# Patient Record
Sex: MALE | Race: WHITE
[De-identification: names, ages, dates, MRNs, and addresses within clinical notes are randomized per-mention and may not be internally consistent; named-entity substitution may affect disease eponyms.]

---

## 2020-08-14 ENCOUNTER — HOSPITAL ENCOUNTER (OUTPATIENT)
Dept: HOSPITAL 47 - RADPETMAIN | Age: 70
Discharge: HOME | End: 2020-08-14
Attending: RADIOLOGY
Payer: OTHER GOVERNMENT

## 2020-08-14 DIAGNOSIS — Z92.3: ICD-10-CM

## 2020-08-14 DIAGNOSIS — Z98.890: ICD-10-CM

## 2020-08-14 DIAGNOSIS — R59.0: ICD-10-CM

## 2020-08-14 DIAGNOSIS — C79.51: ICD-10-CM

## 2020-08-14 DIAGNOSIS — C32.0: ICD-10-CM

## 2020-08-14 DIAGNOSIS — C78.01: Primary | ICD-10-CM

## 2020-08-14 PROCEDURE — 78815 PET IMAGE W/CT SKULL-THIGH: CPT

## 2020-08-18 NOTE — PE
EXAMINATION TYPE: PET CT fusion skull to thigh

 

DATE OF EXAM: 8/14/2020

 

COMPARISON: Prior outside PET/CT January 27, 2020

 

HISTORY: Laryngeal cancer diagnosed January 9, 2020 had surgery February 25, 2020 and radiation treat
ment in May.

 

TECHNIQUE:  Following the intravenous administration of 11.63 mCi of F-18 FDG, whole body images are 
performed from the skull base to the midthigh.  Images are reviewed on the computer in the coronal, a
xial, and sagittal planes.  Reconstructed rotating images are created on independent workstation and 
reviewed on the computer.   A noncontrast CT is performed in conjunction with the PET scan. Dedicated
 PET/CT imaging of the neck is performed.

 

SCAN: Subsequent Scan

 

FINDINGS: 

 

SKULL BASE AND NECK:  Hypermetabolic uptake on prior study at level of the vocal cords with soft tiss
ue thickening is now not identified. There has been interval extensive neck surgery with resection of
 this area and numerous surgical clips submandibular region. Thyroid gland is now surgically absent. 
Tracheostomy tube now present below this level. No suspicious areas of new or residual abnormal hyper
metabolic uptake.

 

CHEST, MEDIASTINUM, AND HILAR REGION: There is however interval development of new large hypermetabol
ic right invasive hilar mass axial image 93 measuring roughly 6.1 x 4.2 cm, max SUV is 6.65. There is
 some postobstructive atelectasis along the periphery.

 

There is adjacent additional hypermetabolic 2.2 x 1.9 cm right paratracheal adenopathy.. There is add
itional hypermetabolic right hilar lymph node measuring 2.7 x 2.6 cm axial image 101, max SUV is 4.72
.

 

ABDOMEN AND PELVIS: Some faint multifocal subcentimeter areas of hypermetabolic uptake throughout the
 liver are suspicious for metastatic disease though CT correlates not clearly identified. For referen
ce lateral left hepatic lobe punctate focus max SUV 3.58 on axial image 149. Largest lesion with some
 central calcifications lateral segment left hepatic lobe axial image 137 is favored benign as is delaney
tabolic and was present on prior study. Similar subcentimeter benign low dense lesion anterior liver 
axial image 146 noted.

 

Normal excretion is seen. No additional areas of suspicious hypermetabolic uptake.

 

OSSEOUS STRUCTURES: New Osseous metastatic disease with several new hypermetabolic foci, for referenc
e right iliac lesion and left sacral lesion axial image 206 along with lytic focus L5 vertebra axial 
image 190. There is large hypermetabolic lesion L1 vertebra axial image 151, max SUV is 5.85. Few add
itional scattered right rib lesions are felt present.

 

OTHER CT: Coronary artery calcification and/or stents are identified. There is new tiny right pleural
 effusion.

 

There are several thin-walled cysts scattered throughout both kidneys. There is retroaortic left south
l vein which is normal variant. There is ectatic and mild/moderate calcification in the abdominal aor
ta. Sigmoid colonic diverticula. Scattered pelvic phleboliths.

 

IMPRESSION: Successful surgical and radiation treatment of primary laryngeal neoplasm but interval de
velopment of metastatic disease in the central right lung with thoracic adenopathy, new osseous metas
tatic disease, and suspected new subcentimeter hepatic metastatic disease.

## 2020-08-21 ENCOUNTER — HOSPITAL ENCOUNTER (OUTPATIENT)
Dept: HOSPITAL 47 - RADMRIMAIN | Age: 70
Discharge: HOME | End: 2020-08-21
Attending: RADIOLOGY
Payer: OTHER GOVERNMENT

## 2020-08-21 DIAGNOSIS — Z92.3: ICD-10-CM

## 2020-08-21 DIAGNOSIS — Z97.8: ICD-10-CM

## 2020-08-21 DIAGNOSIS — I67.82: ICD-10-CM

## 2020-08-21 DIAGNOSIS — G93.89: ICD-10-CM

## 2020-08-21 DIAGNOSIS — C32.0: ICD-10-CM

## 2020-08-21 DIAGNOSIS — G31.9: Primary | ICD-10-CM

## 2020-08-21 PROCEDURE — 70553 MRI BRAIN STEM W/O & W/DYE: CPT

## 2020-08-21 NOTE — MR
EXAMINATION TYPE: MR brain wo/w con

 

DATE OF EXAM: 8/21/2020

 

COMPARISON: None

 

HISTORY: Malignant neoplasm of glottis, head pain

 

TECHNIQUE: 

Multiplanar, multisequence images of the brain and brainstem is performed without and with IV contras
t, utilizing 7.5 mL intravenous Gadavist .

 

FINDINGS: Diffusion weighted images demonstrate no evidence of a recent infarct or other diffusion ab
normality. There is moderate generalized degenerative change. Areas of abnormal signal in the white m
atter are nonspecific but most typical remote microvascular ischemia.

 

There is a 5 mm nodular change involving the left frontal bone for which a CT scan is recommended. No
 intraparenchymal areas of enhancement suspicious for metastases.

 

Midline structures demonstrate normal morphology.  The craniocervical junction appears within normal 
limits.  Post contrast images demonstrate no abnormal enhancement. The dural venous sinuses appear pa
tent. Changes of chronic sinusitis noted.

 

 

IMPRESSION: 

1. No intracranial areas of metastases seen. Degenerative and nonspecific white matter changes most t
ypical remote microvascular ischemia.

2. There is a 1 cm area of nodular prominence involving the left frontal bone which be correlated wit
h CT of the brain with bone windows for further evaluation. Best noted on postcontrast T1 axial image
 49.

## 2020-12-12 ENCOUNTER — HOSPITAL ENCOUNTER (OUTPATIENT)
Dept: HOSPITAL 47 - RADPETMAIN | Age: 70
Discharge: HOME | End: 2020-12-12
Attending: INTERNAL MEDICINE
Payer: OTHER GOVERNMENT

## 2020-12-12 DIAGNOSIS — Z92.21: ICD-10-CM

## 2020-12-12 DIAGNOSIS — C79.51: Primary | ICD-10-CM

## 2020-12-12 DIAGNOSIS — C34.81: ICD-10-CM

## 2020-12-12 PROCEDURE — 78815 PET IMAGE W/CT SKULL-THIGH: CPT

## 2020-12-14 NOTE — PE
EXAMINATION TYPE: PET CT fusion skull to thigh

 

DATE OF EXAM: 12/12/2020

 

COMPARISON: Most recent PET CT August 14, 2020 and older studies.

 

HISTORY: Head and neck cancer diagnosed January 2020 and lung cancer diagnosed August 2020 completed 
radiation treatment in September and chemotherapy in November.   

 

TECHNIQUE:  Following the intravenous administration of 9.4 mCi of F-18 FDG, whole body images are pe
rformed from the skull base to the midthigh.  Images are reviewed on the computer in the coronal, axi
al, and sagittal planes.  Reconstructed rotating images are created on independent workstation and re
viewed on the computer.   A localization and attenuation correction CT is performed in conjunction wi
th the PET scan. PET/CT imaging of the neck is performed.

 

SCAN: Subsequent Scan

 

FINDINGS: 

 

SKULL BASE AND NECK:  No new areas of abnormal hypermetabolic uptake. Extensive surgical and post manish
atment change redemonstrated with tracheostomy tube inferiorly again seen.

 

CHEST, MEDIASTINUM, AND HILAR REGION: Marked interval improvement in the hypermetabolic large invasiv
e right hilar mass or neoplasm with some residual abnormal soft tissue surrounding right upper lobe b
ronchus axial image 85, this area is currently ametabolic.

 

Marked interval improvement in the right peritracheal/tracheobronchial lymph node measuring 1.0 x 0.8
 cm current study image 83 versus 2.2 x 1.9 cm prior study. Lymph node currently ametabolic. Hypermet
abolic right hilar lymph node on prior study less well seen, no residual hypermetabolic uptake.

 

No new areas of abnormal hypermetabolic uptake.

 

ABDOMEN AND PELVIS: No areas of abnormal hypermetabolic uptake.

 

OSSEOUS STRUCTURES: No new areas of abnormal hypermetabolic uptake. Scattered sclerotic osseous metas
tatic lesions redemonstrated greatest in the pelvis with interval progression in size and number of s
clerotic lesions noted.

 

OTHER CT: Coronary artery calcification and/or stents are redemonstrated. 

 

There are several thin-walled cysts scattered throughout both kidneys. There is retroaortic left south
l vein which is normal variant. There is ectatic and mild/moderate calcification in the abdominal aor
ta. Stable low dense lesion lateral aspect left hepatic lobe presumed benign with central thin septat
ion and calcification.

 

Sigmoid colonic diverticula. Scattered pelvic phleboliths.

 

 

IMPRESSION: No areas of abnormal hypermetabolic uptake on current study. Marked interval improvement 
in right hilar mass or neoplasm and thoracic adenopathy. Interval progression in osseous sclerotic me
tastatic disease without abnormal hypermetabolic uptake is noted.

## 2021-03-05 ENCOUNTER — HOSPITAL ENCOUNTER (EMERGENCY)
Dept: HOSPITAL 47 - EC | Age: 71
Discharge: HOME | End: 2021-03-05
Payer: OTHER GOVERNMENT

## 2021-03-05 VITALS
DIASTOLIC BLOOD PRESSURE: 83 MMHG | HEART RATE: 58 BPM | TEMPERATURE: 98 F | SYSTOLIC BLOOD PRESSURE: 124 MMHG | RESPIRATION RATE: 20 BRPM

## 2021-03-05 DIAGNOSIS — R55: Primary | ICD-10-CM

## 2021-03-05 DIAGNOSIS — Z87.891: ICD-10-CM

## 2021-03-05 DIAGNOSIS — I10: ICD-10-CM

## 2021-03-05 LAB
ALBUMIN SERPL-MCNC: 4.1 G/DL (ref 3.5–5)
ALP SERPL-CCNC: 76 U/L (ref 38–126)
ALT SERPL-CCNC: 20 U/L (ref 4–49)
ANION GAP SERPL CALC-SCNC: 13 MMOL/L
APTT BLD: 19.5 SEC (ref 22–30)
AST SERPL-CCNC: 26 U/L (ref 17–59)
BUN SERPL-SCNC: 22 MG/DL (ref 9–20)
CALCIUM SPEC-MCNC: 9.5 MG/DL (ref 8.4–10.2)
CELLS COUNTED: 100
CHLORIDE SERPL-SCNC: 104 MMOL/L (ref 98–107)
CO2 SERPL-SCNC: 20 MMOL/L (ref 22–30)
EOSINOPHIL # BLD MANUAL: 0.14 K/UL (ref 0–0.7)
ERYTHROCYTE [DISTWIDTH] IN BLOOD BY AUTOMATED COUNT: 4.73 M/UL (ref 4.3–5.9)
ERYTHROCYTE [DISTWIDTH] IN BLOOD: 14.1 % (ref 11.5–15.5)
GLUCOSE SERPL-MCNC: 121 MG/DL (ref 74–99)
HCT VFR BLD AUTO: 42.4 % (ref 39–53)
HGB BLD-MCNC: 14.1 GM/DL (ref 13–17.5)
INR PPP: 0.9 (ref ?–1.2)
LYMPHOCYTES # BLD MANUAL: 1.41 K/UL (ref 1–4.8)
MAGNESIUM SPEC-SCNC: 2 MG/DL (ref 1.6–2.3)
MCH RBC QN AUTO: 29.7 PG (ref 25–35)
MCHC RBC AUTO-ENTMCNC: 33.2 G/DL (ref 31–37)
MCV RBC AUTO: 89.6 FL (ref 80–100)
MONOCYTES # BLD MANUAL: 0.66 K/UL (ref 0–1)
NEUTROPHILS NFR BLD MANUAL: 53 %
NEUTS SEG # BLD MANUAL: 2.49 K/UL (ref 1.3–7.7)
PLATELET # BLD AUTO: 167 K/UL (ref 150–450)
POTASSIUM SERPL-SCNC: 3.8 MMOL/L (ref 3.5–5.1)
PROT SERPL-MCNC: 7.1 G/DL (ref 6.3–8.2)
PT BLD: 10 SEC (ref 9–12)
SODIUM SERPL-SCNC: 137 MMOL/L (ref 137–145)
WBC # BLD AUTO: 4.7 K/UL (ref 3.8–10.6)

## 2021-03-05 PROCEDURE — 83605 ASSAY OF LACTIC ACID: CPT

## 2021-03-05 PROCEDURE — 85730 THROMBOPLASTIN TIME PARTIAL: CPT

## 2021-03-05 PROCEDURE — 80320 DRUG SCREEN QUANTALCOHOLS: CPT

## 2021-03-05 PROCEDURE — 84484 ASSAY OF TROPONIN QUANT: CPT

## 2021-03-05 PROCEDURE — 93005 ELECTROCARDIOGRAM TRACING: CPT

## 2021-03-05 PROCEDURE — 36415 COLL VENOUS BLD VENIPUNCTURE: CPT

## 2021-03-05 PROCEDURE — 71046 X-RAY EXAM CHEST 2 VIEWS: CPT

## 2021-03-05 PROCEDURE — 80053 COMPREHEN METABOLIC PANEL: CPT

## 2021-03-05 PROCEDURE — 85610 PROTHROMBIN TIME: CPT

## 2021-03-05 PROCEDURE — 85025 COMPLETE CBC W/AUTO DIFF WBC: CPT

## 2021-03-05 PROCEDURE — 99285 EMERGENCY DEPT VISIT HI MDM: CPT

## 2021-03-05 PROCEDURE — 96360 HYDRATION IV INFUSION INIT: CPT

## 2021-03-05 PROCEDURE — 83735 ASSAY OF MAGNESIUM: CPT

## 2021-03-05 PROCEDURE — 83880 ASSAY OF NATRIURETIC PEPTIDE: CPT

## 2021-03-05 NOTE — ED
SOB HPI





- General


Chief Complaint: Shortness of Breath


Stated Complaint: altered mental status


Time Seen by Provider: 03/05/21 02:31


Source: EMS


Mode of arrival: EMS


Limitations: language barrier





- History of Present Illness


Initial Comments: 





This patient is a 70-year-old man brought by ambulance for evaluation after his 

wife had found him slumped over at home and had a difficult time arousing him.  

History from the patient is slightly limited as she is not speaking secondary to

tracheostomy.  He is able to communicate fairly well through gestures.  When I 

interview him he complains of some shortness of breath which he indicates is 

mild and also a frontal headache that he indicates is moderate, not worst 

headache of life.





Further history from the patient's wife does reveal that the patient had slumped

over and she was uncertain if he was breathing.  When EMS had arrived and they 

repositioned him he did have an episode of vomiting.  He had completed a round 

of immunotherapy for his cancer on Monday and he had covid vaccination on 

Tuesday.


MD Complaint: shortness of breath


-: minutes(s)


Consistency: constant


Improves With: nothing


Worsens With: nothing


Treatments Prior to Arrival: none





- Related Data


                                Home Medications











 Medication  Instructions  Recorded  Confirmed


 


Atenolol [Tenormin] 50 mg PO QAM 08/25/20 08/25/20


 


Levothyroxine Sodium 125 mcg PO QAM 08/25/20 08/25/20


 


Multivitamins, Thera [Multivitamin 1 tab PO DAILY 08/25/20 08/25/20





(formulary)]   


 


hydroCHLOROthiazide 25 mg PO QAM 08/25/20 08/25/20


 


diazePAM [Valium] 5 mg PO Q8HR PRN 08/27/20 08/27/20











                                    Allergies











Allergy/AdvReac Type Severity Reaction Status Date / Time


 


No Known Allergies Allergy   Verified 08/27/20 11:18














Review of Systems


ROS Statement: 


Those systems with pertinent positive or pertinent negative responses have been 

documented in the HPI.





ROS Other: All systems not noted in ROS Statement are negative.


Constitutional: Denies: fever, chills


Respiratory: Reports: dyspnea.  Denies: cough, wheezes, hemoptysis


Cardiovascular: Denies: chest pain, palpitations, orthopnea, edema, syncope


Gastrointestinal: Denies: abdominal pain, nausea, vomiting


Genitourinary: Denies: dysuria, hematuria


Musculoskeletal: Denies: back pain


Skin: Denies: rash


Neurological: Reports: headache.  Denies: weakness





Past Medical History


Past Medical History: Cancer, Hypertension, Thyroid Disorder


Additional Past Medical History / Comment(s): CURRENT: PET SCAN SHOWS POSSIBLE 

METASTATIC LESION IN RIGHT CENTRAL LUNG; PATIENT HAS HAD A COUGH FOR SEVERAL 

WEEKS.   2020, GLOTTIS CANCER (SQUAMOUS CELL).  MULTIPLE SKIN CANCERS RIGHT 

CHEST, LEFT ELBOW,   RIGHT ELBOW.


History of Any Multi-Drug Resistant Organisms: None Reported


Additional Past Surgical History / Comment(s): FEB 25, 2020 LARYNGECTOMY 

(TRACH), PARTIAL RESECTION OF NASAL AND THROAT ARE & THYROIDECTOMY @ [facility].  PROSTHETIC USED TO HELP HIM TO TALK.  ABLE TO EAT ORALLY.


Past Anesthesia/Blood Transfusion Reactions: No Reported Reaction


Past Psychological History: No Psychological Hx Reported


Smoking Status: Former smoker


Past Alcohol Use History: Daily


Past Drug Use History: None Reported





General Exam


Limitations: no limitations


General appearance: alert, in no apparent distress


Head exam: Present: atraumatic, normocephalic


Eye exam: Present: normal appearance, PERRL, EOMI.  Absent: scleral icterus, 

conjunctival injection, nystagmus


ENT exam: Present: mucous membranes dry


Neck exam: Present: full ROM, other (Tracheostomy).  Absent: meningismus


Respiratory exam: Present: normal lung sounds bilaterally.  Absent: respiratory 

distress, wheezes, rales, rhonchi, stridor


Cardiovascular Exam: Present: regular rate, normal rhythm, normal heart sounds. 

 Absent: systolic murmur, diastolic murmur, rubs, gallop


GI/Abdominal exam: Present: soft.  Absent: distended, tenderness, guarding, 

rebound, rigid, mass


Extremities exam: Present: normal inspection, normal capillary refill.  Absent: 

pedal edema, calf tenderness


Back exam: Present: normal inspection.  Absent: CVA tenderness (R), CVA 

tenderness (L)


Neurological exam: Present: alert


Skin exam: Present: warm, dry, intact, normal color.  Absent: rash





Course


                                   Vital Signs











  03/05/21





  05:00


 


Temperature 97.5 F L


 


Pulse Rate 56 L


 


Respiratory 17





Rate 


 


Blood Pressure 119/71


 


O2 Sat by Pulse 93 L





Oximetry 














Medical Decision Making





- Medical Decision Making





I did re-evaluate the patient and review the study results.  He states that he 

is feeling well, his headache has resolved and he would like to go home.


Given the patient's has metastatic cancer will defer to his wishes and I did 

refill review the appropriate further care and follow-up as well as return 

parameters.





- Lab Data


Result diagrams: 


                                 03/05/21 02:53





                                 03/05/21 02:53


                                   Lab Results











  03/05/21 03/05/21 03/05/21 Range/Units





  02:53 02:53 02:53 


 


WBC  4.7    (3.8-10.6)  k/uL


 


RBC  4.73    (4.30-5.90)  m/uL


 


Hgb  14.1    (13.0-17.5)  gm/dL


 


Hct  42.4    (39.0-53.0)  %


 


MCV  89.6    (80.0-100.0)  fL


 


MCH  29.7    (25.0-35.0)  pg


 


MCHC  33.2    (31.0-37.0)  g/dL


 


RDW  14.1    (11.5-15.5)  %


 


Plt Count  167    (150-450)  k/uL


 


MPV  7.3    


 


Neutrophils % (Manual)  53    %


 


Lymphocytes % (Manual)  30    %


 


Monocytes % (Manual)  14    %


 


Eosinophils % (Manual)  3    %


 


Neutrophils # (Manual)  2.49    (1.3-7.7)  k/uL


 


Lymphocytes # (Manual)  1.41    (1.0-4.8)  k/uL


 


Monocytes # (Manual)  0.66    (0-1.0)  k/uL


 


Eosinophils # (Manual)  0.14    (0-0.7)  k/uL


 


Nucleated RBCs  0    (0-0)  /100 WBC


 


Manual Slide Review  Performed    


 


Anisocytosis (manual)  Present    


 


PT   10.0   (9.0-12.0)  sec


 


INR   0.9   (<1.2)  


 


APTT   19.5 L   (22.0-30.0)  sec


 


Sodium    137  (137-145)  mmol/L


 


Potassium    3.8  (3.5-5.1)  mmol/L


 


Chloride    104  ()  mmol/L


 


Carbon Dioxide    20 L  (22-30)  mmol/L


 


Anion Gap    13  mmol/L


 


BUN    22 H  (9-20)  mg/dL


 


Creatinine    1.13  (0.66-1.25)  mg/dL


 


Est GFR (CKD-EPI)AfAm    75  (>60 ml/min/1.73 sqM)  


 


Est GFR (CKD-EPI)NonAf    65  (>60 ml/min/1.73 sqM)  


 


Glucose    121 H  (74-99)  mg/dL


 


Lactic Ac Sepsis Rflx     


 


Plasma Lactic Acid Buddy     (0.7-2.0)  mmol/L


 


Calcium    9.5  (8.4-10.2)  mg/dL


 


Magnesium    2.0  (1.6-2.3)  mg/dL


 


Total Bilirubin    0.3  (0.2-1.3)  mg/dL


 


AST    26  (17-59)  U/L


 


ALT    20  (4-49)  U/L


 


Alkaline Phosphatase    76  ()  U/L


 


Troponin I     (0.000-0.034)  ng/mL


 


NT-Pro-B Natriuret Pep     pg/mL


 


Total Protein    7.1  (6.3-8.2)  g/dL


 


Albumin    4.1  (3.5-5.0)  g/dL


 


Serum Alcohol    142  mg/dL














  03/05/21 03/05/21 03/05/21 Range/Units





  02:53 02:53 02:53 


 


WBC     (3.8-10.6)  k/uL


 


RBC     (4.30-5.90)  m/uL


 


Hgb     (13.0-17.5)  gm/dL


 


Hct     (39.0-53.0)  %


 


MCV     (80.0-100.0)  fL


 


MCH     (25.0-35.0)  pg


 


MCHC     (31.0-37.0)  g/dL


 


RDW     (11.5-15.5)  %


 


Plt Count     (150-450)  k/uL


 


MPV     


 


Neutrophils % (Manual)     %


 


Lymphocytes % (Manual)     %


 


Monocytes % (Manual)     %


 


Eosinophils % (Manual)     %


 


Neutrophils # (Manual)     (1.3-7.7)  k/uL


 


Lymphocytes # (Manual)     (1.0-4.8)  k/uL


 


Monocytes # (Manual)     (0-1.0)  k/uL


 


Eosinophils # (Manual)     (0-0.7)  k/uL


 


Nucleated RBCs     (0-0)  /100 WBC


 


Manual Slide Review     


 


Anisocytosis (manual)     


 


PT     (9.0-12.0)  sec


 


INR     (<1.2)  


 


APTT     (22.0-30.0)  sec


 


Sodium     (137-145)  mmol/L


 


Potassium     (3.5-5.1)  mmol/L


 


Chloride     ()  mmol/L


 


Carbon Dioxide     (22-30)  mmol/L


 


Anion Gap     mmol/L


 


BUN     (9-20)  mg/dL


 


Creatinine     (0.66-1.25)  mg/dL


 


Est GFR (CKD-EPI)AfAm     (>60 ml/min/1.73 sqM)  


 


Est GFR (CKD-EPI)NonAf     (>60 ml/min/1.73 sqM)  


 


Glucose     (74-99)  mg/dL


 


Lactic Ac Sepsis Rflx     


 


Plasma Lactic Acid Buddy  2.2 H*    (0.7-2.0)  mmol/L


 


Calcium     (8.4-10.2)  mg/dL


 


Magnesium     (1.6-2.3)  mg/dL


 


Total Bilirubin     (0.2-1.3)  mg/dL


 


AST     (17-59)  U/L


 


ALT     (4-49)  U/L


 


Alkaline Phosphatase     ()  U/L


 


Troponin I   <0.012   (0.000-0.034)  ng/mL


 


NT-Pro-B Natriuret Pep    142  pg/mL


 


Total Protein     (6.3-8.2)  g/dL


 


Albumin     (3.5-5.0)  g/dL


 


Serum Alcohol     mg/dL














  03/05/21 Range/Units





  04:24 


 


WBC   (3.8-10.6)  k/uL


 


RBC   (4.30-5.90)  m/uL


 


Hgb   (13.0-17.5)  gm/dL


 


Hct   (39.0-53.0)  %


 


MCV   (80.0-100.0)  fL


 


MCH   (25.0-35.0)  pg


 


MCHC   (31.0-37.0)  g/dL


 


RDW   (11.5-15.5)  %


 


Plt Count   (150-450)  k/uL


 


MPV   


 


Neutrophils % (Manual)   %


 


Lymphocytes % (Manual)   %


 


Monocytes % (Manual)   %


 


Eosinophils % (Manual)   %


 


Neutrophils # (Manual)   (1.3-7.7)  k/uL


 


Lymphocytes # (Manual)   (1.0-4.8)  k/uL


 


Monocytes # (Manual)   (0-1.0)  k/uL


 


Eosinophils # (Manual)   (0-0.7)  k/uL


 


Nucleated RBCs   (0-0)  /100 WBC


 


Manual Slide Review   


 


Anisocytosis (manual)   


 


PT   (9.0-12.0)  sec


 


INR   (<1.2)  


 


APTT   (22.0-30.0)  sec


 


Sodium   (137-145)  mmol/L


 


Potassium   (3.5-5.1)  mmol/L


 


Chloride   ()  mmol/L


 


Carbon Dioxide   (22-30)  mmol/L


 


Anion Gap   mmol/L


 


BUN   (9-20)  mg/dL


 


Creatinine   (0.66-1.25)  mg/dL


 


Est GFR (CKD-EPI)AfAm   (>60 ml/min/1.73 sqM)  


 


Est GFR (CKD-EPI)NonAf   (>60 ml/min/1.73 sqM)  


 


Glucose   (74-99)  mg/dL


 


Lactic Ac Sepsis Rflx  Y  


 


Plasma Lactic Acid Buddy   (0.7-2.0)  mmol/L


 


Calcium   (8.4-10.2)  mg/dL


 


Magnesium   (1.6-2.3)  mg/dL


 


Total Bilirubin   (0.2-1.3)  mg/dL


 


AST   (17-59)  U/L


 


ALT   (4-49)  U/L


 


Alkaline Phosphatase   ()  U/L


 


Troponin I   (0.000-0.034)  ng/mL


 


NT-Pro-B Natriuret Pep   pg/mL


 


Total Protein   (6.3-8.2)  g/dL


 


Albumin   (3.5-5.0)  g/dL


 


Serum Alcohol   mg/dL














- EKG Data


-: EKG Interpreted by Me


EKG shows normal: sinus rhythm, axis (Normal), intervals (MD interval 224 ms, 

consistent with first-degree AV block.  QRS duration 100 ms,  ms, both 

normal), QRS complexes (Normal), ST-T waves (Normal)


Rate: bradycardia (Rate 52 bpm)





Disposition


Clinical Impression: 


 Syncope





Disposition: HOME SELF-CARE


Condition: Good


Instructions (If sedation given, give patient instructions):  Syncope (ED)


Is patient prescribed a controlled substance at d/c from ED?: No


Referrals: 


William Winter MD [STAFF PHYSICIAN] - 1-2 days

## 2021-03-05 NOTE — XR
EXAM:

  XR Chest, 2 Views

 

CLINICAL HISTORY:

  ITS.REASON XR Reason: difficulty breathing

 

TECHNIQUE:

  Frontal and lateral views of the chest.

 

COMPARISON:

  No relevant prior studies available.

 

FINDINGS:

  Lungs:  Unremarkable.  No consolidation.

  Pleural space:  Unremarkable.  No pneumothorax.

  Heart:  Unremarkable.  No cardiomegaly.

  Mediastinum:  Unremarkable.

  Bones/joints:  Unremarkable.

 

IMPRESSION:     

No acute pulmonary process.

## 2021-03-19 ENCOUNTER — HOSPITAL ENCOUNTER (OUTPATIENT)
Dept: HOSPITAL 47 - RADCTMAIN | Age: 71
End: 2021-03-19
Attending: INTERNAL MEDICINE
Payer: OTHER GOVERNMENT

## 2021-03-19 DIAGNOSIS — C34.91: Primary | ICD-10-CM

## 2021-03-19 DIAGNOSIS — J98.4: ICD-10-CM

## 2021-03-19 LAB — BUN SERPL-SCNC: 20 MG/DL (ref 9–20)

## 2021-03-19 PROCEDURE — 71260 CT THORAX DX C+: CPT

## 2021-03-19 PROCEDURE — 36415 COLL VENOUS BLD VENIPUNCTURE: CPT

## 2021-03-19 PROCEDURE — 84520 ASSAY OF UREA NITROGEN: CPT

## 2021-03-19 PROCEDURE — 82565 ASSAY OF CREATININE: CPT

## 2021-03-19 NOTE — CT
EXAMINATION TYPE: CT chest w con

 

DATE OF EXAM: 3/19/2021

 

COMPARISON: PET CT 12/12/2020

 

HISTORY: Lung cancer, shortness of breath.

 

CT DLP: 412.8 mGycm, Automated exposure control for dose reduction was used.

 

CONTRAST: Performed injected with 80ml mL of Isovue 300.

 

TECHNIQUE: Axial images were obtained at 5 mm thick sections.  Reconstructed images are reviewed on [employer] computer in the coronal plane. 

 

FINDINGS: The thyroid is not identified. Tracheostomy tube is present. Dense structure is between the
 trachea and the esophagus.

 

Right apical thickening is present. Air is some thickening along the major fissure on the right. This
 appears diminished from the comparison. There is a density within the major fissure measuring 1.5 cm
 in diameter which is stable in size but appears slightly thinner

 

No enlarged mediastinal or hilar adenopathy is evident.   The ascending aorta diameter at the level o
f the main pulmonary artery is 3.8 cm.  The main pulmonary artery diameter at the bifurcation is 2.9 
cm.

 

Limited CT sections are obtained through the upper abdomen. Multiple renal cysts are present.

 

Patient complained of shortness of breath while laying down. Patient was evaluated. Patient has high 
blood pressure, the pressure was initially high came back to more normal levels for the patient. On c
lose questioning and then in conjunction with the patient's wife this shortness of breath is apparent
ly is related to the patient's symptoms which prompted the CT examination. There is no itching or dif
ficulty breathing after the patient set up patient stated he was feeling better having cleared his tr
acheostomy. This does not appear to be related to a reaction to the contrast. Discharge instructions 
were given to the patient including emergent returned to the hospital with difficulty breathing reocc
urred. Patient and patient's wife understood the instructions.

 

IMPRESSIONS:

1. Stable thickening right upper lobe major fissure and at the right apex.

1. No new lung findings

## 2021-03-29 ENCOUNTER — HOSPITAL ENCOUNTER (OUTPATIENT)
Dept: HOSPITAL 47 - RADMRIMAIN | Age: 71
Discharge: HOME | End: 2021-03-29
Attending: INTERNAL MEDICINE
Payer: OTHER GOVERNMENT

## 2021-03-29 DIAGNOSIS — C34.91: ICD-10-CM

## 2021-03-29 DIAGNOSIS — C79.31: Primary | ICD-10-CM

## 2021-03-29 PROCEDURE — 70553 MRI BRAIN STEM W/O & W/DYE: CPT

## 2021-03-29 NOTE — MR
EXAMINATION TYPE: MR brain wo/w con

 

DATE OF EXAM: 3/29/2021

 

COMPARISON: MRI brain August 21, 2020

 

HISTORY: Headaches, history of lung cancer

 

TECHNIQUE: 

Multiplanar, multisequence images of the brain and brainstem is performed without and with IV contras
t, utilizing 8 mL intravenous Gadavist .

 

FINDINGS: Diffusion weighted images demonstrate no evidence of a recent infarct . Persistent mild to 
moderate ventricular and sulcal prominence. Persistent scattered foci of T2 hyperintensity throughout
 the white matter bilaterally.

 

Interval development of multiple heterogeneous thick rim-enhancing lesions throughout the brain paren
chyma bilaterally. Approximately 30-40 scattered lesions are identified. There is brainstem and poste
rior fossa involvement.

 

For reference one of larger lesions measures 2.0 x 1.5 x 1.8 cm axial image 31 and coronal image 45 l
eft temporal lobe.

For reference there is right mid pontine lesion measuring 1.5 x 1.3 x 1.2 cm axial image 29 and coron
al image 53.

For reference superficial left parietal lesion measures 2.0 x 1.8 x 2.0 cm axial image 52 and coronal
 image 78. This lesion shows some adjacent vasogenic edema.

 

The craniocervical junction appears within normal limits.  The dural venous sinuses remaining patent.
 Tiny mucosal thickening inferior maxillary sinuses bilaterally. Globes are intact.

 

IMPRESSION: Interval development of extensive metastatic disease as detailed above.

 

A Yellow level critical message alert has been initiated for William Winter MD via the [employer] 36
0 | Critical Results System on 3/29/2021 10:40 PM.  This message alert has been sent to William Winter MD via the preferences provided by the clinician for the receipt of Radiology Critical Findings. [facility] [place] [id number].

## 2021-04-28 ENCOUNTER — HOSPITAL ENCOUNTER (INPATIENT)
Dept: HOSPITAL 47 - EC | Age: 71
LOS: 2 days | Discharge: HOME HEALTH | DRG: 55 | End: 2021-04-30
Attending: INTERNAL MEDICINE | Admitting: INTERNAL MEDICINE
Payer: OTHER GOVERNMENT

## 2021-04-28 DIAGNOSIS — Z51.5: ICD-10-CM

## 2021-04-28 DIAGNOSIS — E87.1: ICD-10-CM

## 2021-04-28 DIAGNOSIS — Z20.822: ICD-10-CM

## 2021-04-28 DIAGNOSIS — E89.0: ICD-10-CM

## 2021-04-28 DIAGNOSIS — Z85.118: ICD-10-CM

## 2021-04-28 DIAGNOSIS — C79.31: Primary | ICD-10-CM

## 2021-04-28 DIAGNOSIS — R29.6: ICD-10-CM

## 2021-04-28 DIAGNOSIS — Z79.890: ICD-10-CM

## 2021-04-28 DIAGNOSIS — C34.90: ICD-10-CM

## 2021-04-28 DIAGNOSIS — C32.9: ICD-10-CM

## 2021-04-28 DIAGNOSIS — Z85.21: ICD-10-CM

## 2021-04-28 DIAGNOSIS — I10: ICD-10-CM

## 2021-04-28 DIAGNOSIS — Z82.49: ICD-10-CM

## 2021-04-28 DIAGNOSIS — Z79.899: ICD-10-CM

## 2021-04-28 DIAGNOSIS — Z87.891: ICD-10-CM

## 2021-04-28 LAB
ALBUMIN SERPL-MCNC: 3.7 G/DL (ref 3.5–5)
ALP SERPL-CCNC: 55 U/L (ref 38–126)
ALT SERPL-CCNC: 39 U/L (ref 4–49)
ANION GAP SERPL CALC-SCNC: 8 MMOL/L
AST SERPL-CCNC: 28 U/L (ref 17–59)
BASOPHILS # BLD AUTO: 0 K/UL (ref 0–0.2)
BASOPHILS NFR BLD AUTO: 0 %
BUN SERPL-SCNC: 28 MG/DL (ref 9–20)
CALCIUM SPEC-MCNC: 9.3 MG/DL (ref 8.4–10.2)
CHLORIDE SERPL-SCNC: 104 MMOL/L (ref 98–107)
CO2 SERPL-SCNC: 22 MMOL/L (ref 22–30)
EOSINOPHIL # BLD AUTO: 0 K/UL (ref 0–0.7)
EOSINOPHIL NFR BLD AUTO: 0 %
ERYTHROCYTE [DISTWIDTH] IN BLOOD BY AUTOMATED COUNT: 5.07 M/UL (ref 4.3–5.9)
ERYTHROCYTE [DISTWIDTH] IN BLOOD: 16.1 % (ref 11.5–15.5)
GLUCOSE SERPL-MCNC: 160 MG/DL (ref 74–99)
HCT VFR BLD AUTO: 44.7 % (ref 39–53)
HGB BLD-MCNC: 14.5 GM/DL (ref 13–17.5)
LYMPHOCYTES # SPEC AUTO: 0.6 K/UL (ref 1–4.8)
LYMPHOCYTES NFR SPEC AUTO: 7 %
MAGNESIUM SPEC-SCNC: 2.1 MG/DL (ref 1.6–2.3)
MCH RBC QN AUTO: 28.7 PG (ref 25–35)
MCHC RBC AUTO-ENTMCNC: 32.5 G/DL (ref 31–37)
MCV RBC AUTO: 88.2 FL (ref 80–100)
MONOCYTES # BLD AUTO: 0.3 K/UL (ref 0–1)
MONOCYTES NFR BLD AUTO: 4 %
NEUTROPHILS # BLD AUTO: 7.9 K/UL (ref 1.3–7.7)
NEUTROPHILS NFR BLD AUTO: 88 %
PLATELET # BLD AUTO: 202 K/UL (ref 150–450)
POTASSIUM SERPL-SCNC: 4.5 MMOL/L (ref 3.5–5.1)
PROT SERPL-MCNC: 6.1 G/DL (ref 6.3–8.2)
SODIUM SERPL-SCNC: 134 MMOL/L (ref 137–145)
WBC # BLD AUTO: 8.9 K/UL (ref 3.8–10.6)

## 2021-04-28 PROCEDURE — 85025 COMPLETE CBC W/AUTO DIFF WBC: CPT

## 2021-04-28 PROCEDURE — 99285 EMERGENCY DEPT VISIT HI MDM: CPT

## 2021-04-28 PROCEDURE — 80048 BASIC METABOLIC PNL TOTAL CA: CPT

## 2021-04-28 PROCEDURE — 83735 ASSAY OF MAGNESIUM: CPT

## 2021-04-28 PROCEDURE — 85027 COMPLETE CBC AUTOMATED: CPT

## 2021-04-28 PROCEDURE — 36415 COLL VENOUS BLD VENIPUNCTURE: CPT

## 2021-04-28 PROCEDURE — 70553 MRI BRAIN STEM W/O & W/DYE: CPT

## 2021-04-28 PROCEDURE — 95713 VEEG 2-12 HR CONT MNTR: CPT

## 2021-04-28 PROCEDURE — 80053 COMPREHEN METABOLIC PANEL: CPT

## 2021-04-28 PROCEDURE — 87636 SARSCOV2 & INF A&B AMP PRB: CPT

## 2021-04-28 PROCEDURE — 70450 CT HEAD/BRAIN W/O DYE: CPT

## 2021-04-28 RX ADMIN — DEXAMETHASONE SODIUM PHOSPHATE SCH MG: 4 INJECTION, SOLUTION INTRAMUSCULAR; INTRAVENOUS at 17:16

## 2021-04-28 RX ADMIN — CEFAZOLIN SCH MLS/HR: 330 INJECTION, POWDER, FOR SOLUTION INTRAMUSCULAR; INTRAVENOUS at 17:17

## 2021-04-28 RX ADMIN — DEXAMETHASONE SODIUM PHOSPHATE SCH MG: 4 INJECTION, SOLUTION INTRAMUSCULAR; INTRAVENOUS at 23:35

## 2021-04-28 NOTE — P.CNNES
History of Present Illness


Consult date: 04/28/21


Requesting physician: Beti Obregon


Reason for Consult: frequent falls and brain mets


History of Present Illness: 


This is a 71-year-old gentleman with medical history of lung cancer and 

laryngeal cancer, with metastasis of the brain who is getting radiation therapy,

trach who presented to the emergency department on 04/28/2021 because of 

multiple falls over the past week.  Patient last round of radiation was on 04 /14/2021.  Patient is accompanied with his wife (Sienna) who helps out with the

history.  Per the patient's wife the patient has been having recurrent falls and

she stated the patient has right leg weakness and does not use his cane or 

walker.  The patient did acknowledge that he and he does not use his cane or 

walker.  Per the wife he would forget to use it and when she tell him to use it 

he refuses and is stubborn about it.  During the episode the patient does not 

have any jerking of the extremities, denies any episodes of loss of 

consciousness with these episodes, urinary or bowel incontinence.  Patient does 

not have any history of seizures.  It seems that the patient had 2 episodes of f

alls yesterday.  Patient follows up with Dr. Villarreal (Radiation Oncology), who 

recommended that the patient the come to the ED and get imaging of the brain 

because of his frequent falls.  Patient is on Decadron 8 mg daily.  He follows-

up with an Oncologist (Dr. Gipson).


Per the patient wife since the patient had brain metastasis she feels his memory

has been off.





Patient was diagnosed with laryneal cancer in 01/2020, right small cell lung 

cancer in August 2020 and brain metastasis in 03/30/2020.  He is a ex-tobacco 

user (smoked 1PPD for 30 years and stopped for 30 years).





Workup in the hospital consisted of:


CT of the head is reported as numerous intracranial lesion largest seen in the 

left frontal horn.  They appeared to be hyper dense suggestive of either 

intracranial hemorrhage or hemorrhagic metastasis.  The largest lesion seen in 

the left frontal lobe measuring 2.1 cm and that compresses the left frontal 

horn.  On a recent previous MRI measured 1.3 cm.  This could represent enla

rgement of the previous noted metastasis or hemorrhage associate with known 

metastasis increasing the lesion in size.  The ventricular size appears to be 

enlarged and greater centrally.  This could be on the basis of degenerative 

change although there is greater central component.  This raises the possibility

of a degree of hydrocephalus.


In the body it is mentioned there is numerous rounded hyper density seen 

involving the brainstem, cerebellar and the cerebrum with the largest seen 

involving the frontal compressing the frontal horn.  Dilation of the ventricle 

system is seen that.  A left temporal lesion also noted with additional small 

lesion in the left frontal lobe.


Other workup is white blood cells 8.9 was considered normal.


Sodium is 134 which is minimally low.  At initial serum glucose is 160.








Review of Systems


Review of system:  The 12 point system was reviewed and apparent positive and 

negative per HPI.








Past Medical History


Past Medical History: Cancer, Hypertension, Thyroid Disorder


Additional Past Medical History / Comment(s): CURRENT: PET SCAN SHOWS POSSIBLE M

ETASTATIC LESION IN RIGHT CENTRAL LUNG; PATIENT HAS HAD A COUGH FOR SEVERAL 

WEEKS.   2020, GLOTTIS CANCER (SQUAMOUS CELL).  MULTIPLE SKIN CANCERS RIGHT 

CHEST, LEFT ELBOW,   RIGHT ELBOW.


History of Any Multi-Drug Resistant Organisms: None Reported


Additional Past Surgical History / Comment(s): FEB 25, 2020 LARYNGECTOMY 

(TRACH), PARTIAL RESECTION OF NASAL AND THROAT ARE & THYROIDECTOMY @ [facility].  PROSTHETIC USED TO HELP HIM TO TALK.  ABLE TO EAT ORALLY.


Past Anesthesia/Blood Transfusion Reactions: No Reported Reaction


Past Psychological History: No Psychological Hx Reported


Smoking Status: Former smoker


Past Alcohol Use History: Daily


Past Drug Use History: None Reported





Medications and Allergies


                                Home Medications











 Medication  Instructions  Recorded  Confirmed  Type


 


Atenolol [Tenormin] 50 mg PO DAILY 08/25/20 04/28/21 History


 


Dexamethasone [Decadron] 8 mg PO AS DIRECTED 04/28/21 04/28/21 History


 


Dicyclomine [Bentyl] 10 mg PO TID 04/28/21 04/28/21 History


 


Levothyroxine Sodium 150 mcg PO DAILY 04/28/21 04/28/21 History








                                    Allergies











Allergy/AdvReac Type Severity Reaction Status Date / Time


 


No Known Allergies Allergy   Verified 04/28/21 12:10














Physical Examination





- Vital Signs


Vital Signs: 


                                   Vital Signs











  Temp Pulse Resp BP Pulse Ox


 


 04/28/21 10:54  98.2 F  52 L  18  164/82  97








                                Intake and Output











 04/27/21 04/28/21 04/28/21





 22:59 06:59 14:59


 


Other:   


 


  Weight   77.564 kg











GENERAL: The patient is lying in bed and is not in acute distress.


HENT: Small scalp lesion over the middle parietal lesion (from fall).  


CHEST: The heart rate is regular rate rhythm.   No murmurs to auscultation.  


LUNG: Has Trach.  Clear to auscultation bilaterally no wheezing noted 

throughout.  Not labored breathing.


ABDOMEN/GI: Bowel sounds present in all 4 quadrants. No tenderness to palpation 

throughout.





NEUROLOGICAL:


Higher mental function: The patient is awake, alert, oriented to self, place and

 time.  Patient is following commands.  Communicated with person he verbal on 

rare occasional otherwise nods and follows commands appropriately.


Cranial nerves: The pupils are round, equal and reactive to light and 

accommodation.  Visual fields are full to confrontation throughout.  Extraocular

 movement is intact no nystagmus is noted.  Facial sensation is normal to touch 

throughout.  The facial strength is normal throughout.  Hearing is normal 

bilaterally to hand rub.  Tongue is midline and moved side-to-side without any 

difficulty.  Shoulder shrug is normal bilaterally.


Motor: Gait: Was leaning towards the right upon walking.  The strength is 5-/5 

over the right knee extension.  Otherwise 5 over 5 throughout.  Normal tone and 

bulk.  


Cerebellum: Normal finger to nose heel to shin bilaterally.


Sensation: Sensation is normal to touch throughout.


Reflexes (right/left): 2+


Plantars are downgoing bilaterally. 








Results





- Laboratory Findings


CBC and BMP: 


                                 04/28/21 11:34





                                 04/28/21 11:34


Abnormal Lab Findings: 


                                  Abnormal Labs











  04/28/21 04/28/21





  11:34 11:34


 


RDW  16.1 H 


 


Neutrophils #  7.9 H 


 


Lymphocytes #  0.6 L 


 


Sodium   134 L


 


BUN   28 H


 


Glucose   160 H


 


Total Protein   6.1 L














Assessment and Plan


Assessment: 





Recurrent falls due to right legs weakness and brainstem lesion causing unsteady

 gait from multiple brain metastasis


Brain metastatsis getting radiation therapy  


Per CT has hyperdense suggestive of either intrcranial hemorrhage vs hemorrhagic

 metastasis.  Pending MRI Brain.


History of small cell lung  (diagnosed 08/2020)


History of laryngeal cancer (diagnosed 01/2020)


Tracheostomy


Ex-tobacco use


.


Plan: 





* CT of the head is reported as numerous intracranial lesion largest seen in the

   left frontal horn.  They appeared to be hyper dense suggestive of either 

  intracranial hemorrhage or hemorrhagic metastasis.  The largest lesion seen in

   the left frontal lobe measuring 2.1 cm and that compresses the left frontal 

  horn.  On a recent previous MRI measured 1.3 cm.  This could represent 

  enlargement of the previous noted metastasis or hemorrhage associate with 

  known metastasis increasing the lesion in size.  The ventricular size appears 

  to be enlarged and greater centrally.  This could be on the basis of 

  degenerative change although there is greater central component.  This raises 

  the possibility of a degree of hydrocephalus.In the body it is mentioned there

   is numerous rounded hyper density seen involving the brainstem, cerebellar 

  and the cerebrum with the largest seen involving the frontal compressing the 

  frontal horn.  Dilation of the ventricle system is seen that.  A left temporal

   lesion also noted with additional small lesion in the left frontal lobe.





* MRI Brain is ordered by ED team STAT.


* I started the patient on Keppra 500mg 1 tab bid (initially wanted him to be on

   Keppra 750mg because of body weight but he wants to be on 500mg instead) for 

  seizure prophylaxis especially with multiple brain lesion.  The patient was 

  notified of side-effects of medication (agitation, irritable, waters).  Patient

   will try medication for 1 month and if he has any side-effects then avoid 

  Keppra and start the patient on Vimpat 50mg 1 tab bid.


* I consulted physical therapy and occupation therapy.


* Dr. tara Villarreal (Radiation Oncologist is consulted).


* Upon discharge the patient needs to follow-up with his Oncologist.


* Recommend restarting Decadron 8 mg daily.


* Patient does not want inpatient physical therapy and rather home therapy.


* Recommend the patient to follow-up with Neurologist as outpatient upon 

  discharge.





The plan is discussed with the patient's nurse.





Thank you for the consultation.





Nolberto Martel MD


Neuro-Hospitalist





Time with Patient: Greater than 30

## 2021-04-28 NOTE — CT
EXAMINATION TYPE: CT brain wo con

 

DATE OF EXAM: 4/28/2021

 

COMPARISON: 3/29/2021

 

HISTORY: frequent falls, dizziness, weakness

 

CT DLP: 1088.4 mGycm

Automated exposure control for dose reduction was used.

 

FINDINGS: 

There are numerous rounded hyperdensity seen involving the brainstem, cerebellum and cerebrum with th
e largest seen involving the left frontal lobe compressing the left frontal horn. Dilation of the abdoulaye
tricular system is seen. There is no midline shift or mass effect. Faint hyperattenuation involving t
he lateral superior left parietal cortex. Intracranial atherosclerotic changes are noted. A left temp
oral lesion also noted with additional smaller lesions frontal lobe. Numerous additional lesions are 
suspected as noted by recent MRI.

 

IMPRESSION:

1. Numerous intracranial lesions the largest seen in the left frontal horn. They appear to be hyperde
nse suggestive of either intracranial hemorrhage or hemorrhagic metastases. The largest lesion seen i
n the left frontal lobe measuring 2.1 cm and compresses the left frontal horn. On the recent previous
 MRI measured 1.3 cm. This could represent enlargement of the previously noted metastases or hemorrha
ge associated with the known metastases increasing the lesion in size.

2. The ventricular size appears to be enlarged and greater centrally. This could be on the basis of d
egenerative change although there is a greater central component. This raises the possibility of a de
gree of hydrocephalus.

## 2021-04-28 NOTE — P.HPIM
History of Present Illness


71-year-old the male with history of small cell lung cancer and's, Paula 

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 

radiation therapy has been falling lately and was also complained of weakness 

predominantly in the right hand and leg there is no obvious clinically 

appreciable weakness.  Patient the does use a walker.  Patient has been falling 

recently.  Patient also has some forgetfulness.  Patient denied any seizures 

patient had 2 falls yesterday has seen radiation oncologist who sent him to ER 

and patient.  Patient had a CT of the head which showed numerous intracranial 

lesions apparently these lesions were present in the previous MRI as well the 

recent previous MRI showed 1.3 cm metastatic lesions and the present computed 

tomography scan showed 2.1 cm left frontal lobe lesion with possible 

intralesional hemorrhage.  There are also multiple other metastatic lesions in 

the brainstem cerebellar and cerebral areas.  Sodium is bit low.





Review of Systems








REVIEW OF SYSTEMS: 


CONSTITUTIONAL: No fever, no malaise, no fatigue. 


HEENT: No recent visual problems or hearing problems. Denied any sore throat. 


CARDIOVASCULAR: No chest pain, orthopnea, PND, no palpitations, no syncope. 


PULMONARY: No shortness of breath, no cough, no hemoptysis. 


GASTROINTESTINAL: No diarrhea, no nausea, no vomiting, no abdominal pain. 


NEUROLOGICAL: As mentioned in HPI


HEMATOLOGICAL: Denies any bleeding or petechiae. 


GENITOURINARY: Denies any burning micturition, frequency, or urgency. 


MUSCULOSKELETAL/RHEUMATOLOGICAL: Denies any joint pain, swelling, or any muscle 

pain. 


ENDOCRINE: Denies any polyuria or polydipsia. 





The rest of the 14-point review of systems is negative.











Past Medical History


Past Medical History: Cancer, Hypertension, Thyroid Disorder


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 

cell carcinoma with laryngectomy/thyroidectomy/neck and throat and nasal 

resection and tracheostomy and radiation treatments,  2020 small cell lung R 

lung cancer with chemo, 3/29/21 lung to brain mets and had 10 radiation 

treatments but now is falling/equalibrium is off/ R leg increased weakness, past

skin cancers with removals.


History of Any Multi-Drug Resistant Organisms: None Reported


Additional Past Surgical History / Comment(s): Total 

laryngectomy/thyroidectomy/throat and nasal resection/tracheostomy with 

prosthetic speaking valve, 20 bronchoscopy with BAL/brochial brushings and 

biopsy, colonoscopy.


Past Anesthesia/Blood Transfusion Reactions: No Reported Reaction


Smoking Status: Former smoker





- Past Family History


  ** Father


Family Medical History: Myocardial Infarction (MI)


Additional Family Medical History / Comment(s): Father  of a MI at the age 

of 58yrs.





  ** Mother


Family Medical History: CVA/TIA


Additional Family Medical History / Comment(s): Mother is alive and is 96yrs 

old.





Medications and Allergies


                                Home Medications











 Medication  Instructions  Recorded  Confirmed  Type


 


Atenolol [Tenormin] 50 mg PO DAILY 20 History


 


Dexamethasone [Decadron] 8 mg PO AS DIRECTED 21 History


 


Dicyclomine [Bentyl] 10 mg PO TID 21 History


 


Levothyroxine Sodium 150 mcg PO DAILY 21 History








                                    Allergies











Allergy/AdvReac Type Severity Reaction Status Date / Time


 


No Known Allergies Allergy   Verified 21 12:10














Physical Exam


Vitals: 


                                   Vital Signs











  Temp Pulse Resp BP Pulse Ox


 


 21 10:54  98.2 F  52 L  18  164/82  97








                                Intake and Output











 21





 06:59 14:59 22:59


 


Other:   


 


  Weight  77.564 kg 77.564 kg

















PHYSICAL EXAMINATION: 





GENERAL: The patient is alert and oriented x3, not in any acute distress. Well 

developed, well nourished. 


HEENT: Pupils are round and equally reacting to light. EOMI. No scleral icterus.

 No conjunctival pallor. Normocephalic, atraumatic. No pharyngeal erythema. No 

thyromegaly. 


CARDIOVASCULAR: S1 and S2 present. No murmurs, rubs, or gallops. 


PULMONARY: Chest is clear to auscultation, no wheezing or crackles. 


ABDOMEN: Soft, nontender, nondistended, normoactive bowel sounds. No palpable 

organomegaly. 


MUSCULOSKELETAL: No joint swelling or deformity.


EXTREMITIES: No cyanosis, clubbing, or pedal edema. 


NEUROLOGICAL: She may have weakness predominantly in the right side which is 

probably secondary to the mass lesion in the left frontal lobe.  Strength is 

almost 5/5 but patient leans towards light when he walks


SKIN: No rashes. 

















Results


CBC & Chem 7: 


                                 21 11:34





                                 04/28/21 11:34


Labs: 


                  Abnormal Lab Results - Last 24 Hours (Table)











  21 Range/Units





  11:34 11:34 


 


RDW  16.1 H   (11.5-15.5)  %


 


Neutrophils #  7.9 H   (1.3-7.7)  k/uL


 


Lymphocytes #  0.6 L   (1.0-4.8)  k/uL


 


Sodium   134 L  (137-145)  mmol/L


 


BUN   28 H  (9-20)  mg/dL


 


Glucose   160 H  (74-99)  mg/dL


 


Total Protein   6.1 L  (6.3-8.2)  g/dL














Thrombosis Risk Factor Assmnt





- Choose All That Apply


Any of the Below Risk Factors Present?: Yes


Each Factor Represents 1 point: Obesity (BMI >25)


Other Risk Factors: Yes


Each Risk Factor Represents 2 Points: Age 61-74 years, Malignancy


Other congenital or acquired thrombophilia - If yes, enter type in comment: No


Thrombosis Risk Factor Assessment Total Risk Factor Score: 5


Thrombosis Risk Factor Assessment Level: High Risk





Assessment and Plan


Plan: 


-Recurrent falls and right leg weakness secondary to brainstem lesion and 

multiple metastatic lesions in the brain from small cell lung cancer.  Patient 

will be started on Decadron neurology evaluated the patient.  Oncology will be 

consulted patient is supposed to undergo immunotherapy for his small cell lung 

cancer.


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

 an ostomy in the trachea..  Patient used to be a smoker in the past.


-Hyponatremia: We will obtain urine random sodium urine random creatinine along 

with serum most molality urine osmolality patient has either hypovolemic 

hyponatremia or SIADH from small cell lung cancer.


-Hypertension 


-hyperthyroidism


-DVT prophylaxis: Will hold off on due to her Lasix for now with concerns of 

intralesional bleed

## 2021-04-28 NOTE — ED
General Adult HPI





<Pierre Goode - Last Filed: 21 13:00>





- General


Source: patient


Mode of arrival: ambulatory


Limitations: no limitations





<Beti Obregon - Last Filed: 21 18:03>





- General


Chief complaint: Fall


Stated complaint: fall


Time Seen by Provider: 21 11:20





- History of Present Illness


Initial comments: 





Patient is a 70-year-old male, with current history of undergoing treatment for 

brain cancer, history of lung cancer and laryngeal cancer, does have a trach, 

presenting for the emergency department for multiple falls over the past week.  

Patient was seen by his radiology oncology Dr. Villarreal, today who recommended 

coming in for brain scan.  His last round of radiation treatments were on 

21, the plan was to wait 6 weeks and have a brain scan however they 

recommended a brain scan today due to frequent falls.  Patient states that he 

feels like he is falling because of weakness in his right leg.  He does not feel

lightheaded or dizzy.  Patient states he has not hit his head.  Patient's wife 

states that he does have a cane and walker at home but does not use them all the

time or sometimes forgets to use them.  He has had some mild memory impairment 

since been no diagnosis of brain cancer.  He's had no recent fevers or chills, 

no cough, no shortness of breath or chest pain.  He states the weakness has been

there for a while, this is not a new symptom but feels like it is getting worse.

 He has been able to eat without difficulty, he does not drink a lot of water.  

He is taking steroids, 8 mg of Decadron secondary to headaches.  These have been

helping with his headaches.  No new medications.  There are no further 

complaints at this time.  Upon arrival to the ER, his vital signs are stable. 

(Beti Obregon)





- Related Data


                                Home Medications











 Medication  Instructions  Recorded  Confirmed


 


Atenolol [Tenormin] 50 mg PO DAILY 20


 


Dexamethasone [Decadron] 8 mg PO AS DIRECTED 21


 


Dicyclomine [Bentyl] 10 mg PO TID 21


 


Levothyroxine Sodium 150 mcg PO DAILY 21











                                    Allergies











Allergy/AdvReac Type Severity Reaction Status Date / Time


 


No Known Allergies Allergy   Verified 21 12:10














Review of Systems


ROS Other: All systems not noted in ROS Statement are negative.





<Pierre Goode SALUD - Last Filed: 21 13:00>


ROS Other: All systems not noted in ROS Statement are negative.





<Beti Obregon - Last Filed: 21 18:03>


ROS Statement: 


Those systems with pertinent positive or pertinent negative responses have been 

documented in the HPI.








Past Medical History


Past Medical History: Cancer, Hypertension, Thyroid Disorder


Additional Past Medical History / Comment(s): CURRENT: PET SCAN SHOWS POSSIBLE 

METASTATIC LESION IN RIGHT CENTRAL LUNG; PATIENT HAS HAD A COUGH FOR SEVERAL 

WEEKS.   2020, GLOTTIS CANCER (SQUAMOUS CELL).  MULTIPLE SKIN CANCERS RIGHT 

CHEST, LEFT ELBOW,   RIGHT ELBOW.


History of Any Multi-Drug Resistant Organisms: None Reported


Additional Past Surgical History / Comment(s): 2020 LARYNGECTOMY 

(TRACH), PARTIAL RESECTION OF NASAL AND THROAT ARE & THYROIDECTOMY @ [facility].  PROSTHETIC USED TO HELP HIM TO TALK.  ABLE TO EAT ORALLY.


Past Anesthesia/Blood Transfusion Reactions: No Reported Reaction


Past Psychological History: No Psychological Hx Reported


Smoking Status: Former smoker


Past Alcohol Use History: Daily


Past Drug Use History: None Reported





- Past Family History


  ** Father


Family Medical History: Myocardial Infarction (MI)


Additional Family Medical History / Comment(s): Father  of a MI at the age 

of 58yrs.





  ** Mother


Family Medical History: CVA/TIA


Additional Family Medical History / Comment(s): Mother is alive and is 96yrs 

old.





<Beti Obregon - Last Filed: 21 18:03>





General Exam


Limitations: no limitations





<Beti Obregon - Last Filed: 21 18:03>





- General Exam Comments


Initial Comments: 





GENERAL: 


Patient is well-developed and well-nourished.  Patient is nontoxic and in no 

acute distress.





HEAD: 


Atraumatic, normocephalic.





EYES:


Pupils equal round and reactive to light, extraocular movements intact, sclera 

anicteric, conjunctiva are normal.  Eyelids were unremarkable.





ENT: 


TMs normal, nares patent, oropharynx clear without exudates.  Moist mucous 

membranes.





NECK: 


Normal range of motion, supple without lymphadenopathy or JVD.  Trach present.





LUNGS:


Unlabored respirations.  Breath sounds clear to auscultation bilaterally and 

equal.  No wheezes rales or rhonchi.





HEART:


Regular rate and rhythm without murmurs, rubs or gallops.





ABDOMEN: 


Soft, nontender, normoactive bowel sounds.  No guarding, no rebound.  No masses 

appreciated.





: Deferred 





MUSCULOSKELETAL: 


Normal extremities with adequate strength and normal range of motion, no pitting

 or edema.  No clubbing or cyanosis.





NEUROLOGICAL: 


Patient is alert and oriented x 3.  Motor and sensory are also intact.  Cranial 

nerves II through XII grossly intact.  Symmetrical smile.  Normal speech.    





PSYCH:


Normal mood, normal affect.





SKIN:


 Warm, Dry, normal turgor, no rashes or lesions noted. (Beti Obregon)





Course





<Pierre Goode - Last Filed: 21 13:00>


                                   Vital Signs











  21





  10:54


 


Temperature 98.2 F


 


Pulse Rate 52 L


 


Respiratory 18





Rate 


 


Blood Pressure 164/82


 


O2 Sat by Pulse 97





Oximetry 














- Reevaluation(s)


Reevaluation #1: 





21 13:00


Patient with known metastatic brain cancer status post radiation presenting with

 frequent falls.  I did discuss case with Dr. Villarreal who is familiar with the 

patient, and didn't request CT imaging.  Patient will likely require rehab for 

this frequent falls.  He did request brain MRI which will be ordered.  Patient 

will be admitted to Dr. Kemp who is aware of the patient and both neurology 

and radiation oncology will be placed on consult. (Pierre Goode)





Medical Decision Making





- Lab Data


Result diagrams: 


                                 21 11:34





                                 21 11:34





<Pierre Goode - Last Filed: 21 13:00>





- Lab Data


Result diagrams: 


                                 21 11:34





                                 21 11:34





<Beti Obregon - Last Filed: 21 18:03>





- Medical Decision Making





Patient is a 70-year-old male, currently undergoing treatment for brain cancer 

with history of long and laryngeal cancer with trach present, presenting for 

frequent falls over the past week.  They were sent in by Dr. Villarreal for CT of 

the brain.  Last radiation was completed on 2021.  His vital signs are 

stable upon arrival.  Labs are stable.  CT of the brain shows numerous lesions, 

hyperdense, when compared to the previous MRI, this could be an enlargement of a

 previously noted mass or hemorrhage associated with the known Metastases.  The 

ventricular size appears to be enlarged as well.  These findings were discussed 

with Dr. Villarreal who wants an order another MRI  to compare his most recent one.

  Patient will be admitted under Dr. Kemp, with consults to Dr Villarreal and 

neuro.  I did order an MRI with and without contrast of the brain.  Case 

discussed with Dr. Goode. (Beti Obregon)





- Lab Data


                                   Lab Results











  21 Range/Units





  11:34 11:34 


 


WBC  8.9   (3.8-10.6)  k/uL


 


RBC  5.07   (4.30-5.90)  m/uL


 


Hgb  14.5   (13.0-17.5)  gm/dL


 


Hct  44.7   (39.0-53.0)  %


 


MCV  88.2   (80.0-100.0)  fL


 


MCH  28.7   (25.0-35.0)  pg


 


MCHC  32.5   (31.0-37.0)  g/dL


 


RDW  16.1 H   (11.5-15.5)  %


 


Plt Count  202   (150-450)  k/uL


 


MPV  6.8   


 


Neutrophils %  88   %


 


Lymphocytes %  7   %


 


Monocytes %  4   %


 


Eosinophils %  0   %


 


Basophils %  0   %


 


Neutrophils #  7.9 H   (1.3-7.7)  k/uL


 


Lymphocytes #  0.6 L   (1.0-4.8)  k/uL


 


Monocytes #  0.3   (0-1.0)  k/uL


 


Eosinophils #  0.0   (0-0.7)  k/uL


 


Basophils #  0.0   (0-0.2)  k/uL


 


Anisocytosis  Slight   


 


Sodium   134 L  (137-145)  mmol/L


 


Potassium   4.5  (3.5-5.1)  mmol/L


 


Chloride   104  ()  mmol/L


 


Carbon Dioxide   22  (22-30)  mmol/L


 


Anion Gap   8  mmol/L


 


BUN   28 H  (9-20)  mg/dL


 


Creatinine   0.84  (0.66-1.25)  mg/dL


 


Est GFR (CKD-EPI)AfAm   >90  (>60 ml/min/1.73 sqM)  


 


Est GFR (CKD-EPI)NonAf   88  (>60 ml/min/1.73 sqM)  


 


Glucose   160 H  (74-99)  mg/dL


 


Calcium   9.3  (8.4-10.2)  mg/dL


 


Magnesium   2.1  (1.6-2.3)  mg/dL


 


Total Bilirubin   1.0  (0.2-1.3)  mg/dL


 


AST   28  (17-59)  U/L


 


ALT   39  (4-49)  U/L


 


Alkaline Phosphatase   55  ()  U/L


 


Total Protein   6.1 L  (6.3-8.2)  g/dL


 


Albumin   3.7  (3.5-5.0)  g/dL














Disposition





<Pierre Goode - Last Filed: 21 13:00>


Decision Date: 21


Decision Time: 13:07





<Beti Obregon - Last Filed: 21 18:03>


Clinical Impression: 


 Frequent falls, Lower extremity weakness, Brain cancer





Disposition: ADMITTED AS IP TO THIS [facility]


Condition: Stable

## 2021-04-29 LAB
ANION GAP SERPL CALC-SCNC: 9.6 MMOL/L (ref 4–12)
BUN SERPL-SCNC: 28 MG/DL (ref 9–27)
BUN/CREAT SERPL: 35 RATIO (ref 12–20)
CALCIUM SPEC-MCNC: 8.9 MG/DL (ref 8.7–10.3)
CHLORIDE SERPL-SCNC: 104 MMOL/L (ref 96–109)
CO2 SERPL-SCNC: 23.4 MMOL/L (ref 21.6–31.8)
ERYTHROCYTE [DISTWIDTH] IN BLOOD BY AUTOMATED COUNT: 5.08 M/UL (ref 4.3–5.9)
ERYTHROCYTE [DISTWIDTH] IN BLOOD: 15.7 % (ref 11.5–15.5)
GLUCOSE SERPL-MCNC: 160 MG/DL (ref 70–110)
HCT VFR BLD AUTO: 45.3 % (ref 39–53)
HGB BLD-MCNC: 15 GM/DL (ref 13–17.5)
MCH RBC QN AUTO: 29.6 PG (ref 25–35)
MCHC RBC AUTO-ENTMCNC: 33.2 G/DL (ref 31–37)
MCV RBC AUTO: 89.3 FL (ref 80–100)
PLATELET # BLD AUTO: 174 K/UL (ref 150–450)
POTASSIUM SERPL-SCNC: 4.8 MMOL/L (ref 3.5–5.5)
SODIUM SERPL-SCNC: 137 MMOL/L (ref 135–145)
WBC # BLD AUTO: 7.7 K/UL (ref 3.8–10.6)

## 2021-04-29 RX ADMIN — DEXAMETHASONE SODIUM PHOSPHATE SCH MG: 4 INJECTION, SOLUTION INTRAMUSCULAR; INTRAVENOUS at 17:53

## 2021-04-29 RX ADMIN — LEVOTHYROXINE SODIUM SCH MCG: 75 TABLET ORAL at 05:44

## 2021-04-29 RX ADMIN — DEXAMETHASONE SODIUM PHOSPHATE SCH MG: 4 INJECTION, SOLUTION INTRAMUSCULAR; INTRAVENOUS at 12:26

## 2021-04-29 RX ADMIN — CEFAZOLIN SCH MLS/HR: 330 INJECTION, POWDER, FOR SOLUTION INTRAMUSCULAR; INTRAVENOUS at 05:44

## 2021-04-29 RX ADMIN — DEXAMETHASONE SODIUM PHOSPHATE SCH MG: 4 INJECTION, SOLUTION INTRAMUSCULAR; INTRAVENOUS at 05:44

## 2021-04-29 RX ADMIN — PANTOPRAZOLE SODIUM SCH MG: 40 TABLET, DELAYED RELEASE ORAL at 17:53

## 2021-04-29 NOTE — P.PN
Subjective


Progress Note Date: 04/29/21











71-year-old the male with history of small cell lung cancer and's, Paula 

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 

radiation therapy has been falling lately and was also complained of weakness 

predominantly in the right hand and leg there is no obvious clinically apprec

iable weakness.  Patient the does use a walker.  Patient has been falling 

recently.  Patient also has some forgetfulness.  Patient denied any seizures 

patient had 2 falls yesterday has seen radiation oncologist who sent him to ER 

and patient.  Patient had a CT of the head which showed numerous intracranial 

lesions apparently these lesions were present in the previous MRI as well the 

recent previous MRI showed 1.3 cm metastatic lesions and the present computed 

tomography scan showed 2.1 cm left frontal lobe lesion with possible 

intralesional hemorrhage.  There are also multiple other metastatic lesions in 

the brainstem cerebellar and cerebral areas.  Sodium is bit low.





04/29/2021





Patient is seen in follow-up and per nursing staff was attempting to get out of 

the chair to go to the bathroom and soiled himself and fell by sliding down onto

his butt.  Patient denies any head injury or hitting anything and denies any 

pain at this time.  Neurology and oncology following the patient is scheduled to

undergo EEG monitoring today.  Sodium was 132 yesterday and patient was placed 

on IV fluids and repeat labs continue to be pending at this time.





Review of systems:


Constitutional: No reports of fatigue, fever, or chills


Cardiovascular: No reports of chest pain or palpitations


Respiratory: No reports of shortness of breath or cough


GI: No reports of nausea, vomiting, or diarrhea


: No reports of dysuria or retention


Neurovascular: Reports generalized weakness 





All medications have been reviewed





Objective





- Vital Signs


Vital signs: 


                                   Vital Signs











Temp  98.2 F   04/29/21 13:07


 


Pulse  72   04/29/21 13:08


 


Resp  17   04/29/21 13:08


 


BP  138/82   04/29/21 13:07


 


Pulse Ox  95   04/29/21 13:07








                                 Intake & Output











 04/28/21 04/29/21 04/29/21





 18:59 06:59 18:59


 


Intake Total 75  


 


Balance 75  


 


Weight 77.564 kg  


 


Intake:   


 


  Intake, IV Titration 75  





  Amount   


 


    Sodium Chloride 0.9% 1, 75  





    000 ml @ 75 mls/hr IV .   





    [id number] [facility] Rx#:[id number]   


 


Other:   


 


  # Voids  3 


 


  # Bowel Movements  1 














- Exam








GENERAL: The patient is alert and oriented x3, not in any acute distress. Well 

developed, well nourished. 


HEENT: Pupils are round and equally reacting to light. EOMI. No scleral icterus.

No conjunctival pallor. Normocephalic, atraumatic. No pharyngeal erythema. No 

thyromegaly. 


CARDIOVASCULAR: S1 and S2 present. No murmurs, rubs, or gallops. 


PULMONARY: Chest is clear to auscultation, no wheezing or crackles. 


ABDOMEN: Soft, nontender, nondistended, normoactive bowel sounds. No palpable 

organomegaly. 


MUSCULOSKELETAL: No joint swelling or deformity.


EXTREMITIES: No cyanosis, clubbing, or pedal edema. 


NEUROLOGICAL:  may have weakness predominantly in the right side which is 

probably secondary to the mass lesion in the left frontal lobe.  Strength is 

almost 5/5 but patient leans towards right when he walks.  Diffusely weak


SKIN: No rashes. 





- Labs


CBC & Chem 7: 


                                 04/29/21 07:22





                                 04/28/21 11:34


Labs: 


                  Abnormal Lab Results - Last 24 Hours (Table)











  04/29/21 Range/Units





  07:22 


 


RDW  15.7 H  (11.5-15.5)  %














Assessment and Plan


Assessment: 











-Recurrent falls and right leg weakness secondary to brainstem lesion and m

ultiple metastatic lesions in the brain from small cell lung cancer.  Patient 

will be started on Decadron neurology following and patient undergoing EEG 

today.  Oncology following patient is supposed to undergo immunotherapy for his 

small cell lung cancer.


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

an ostomy in the trachea..  Patient used to be a smoker in the past.


-Hyponatremia: We will obtain urine random sodium urine random creatinine along 

with serum osmolality urine osmolality patient has either hypovolemic 

hyponatremia or SIADH from small cell lung cancer.


-Hypertension 


-hyperthyroidism


-DVT prophylaxis: Will hold off on due to  concerns of intralesional bleed





Plan:


Patient had EEG to rule out seizure-like activity and is maintained on Keppra 

and will continue at this time.  Neurology is following.  Patient continues to 

be weak with an unsteady gait and PT/OT following.  Oncology also following and 

a consult to radiation therapy has been placed and pending.  Patient being 

started on dexamethasone.  Repeat labs continue to be pending and will await.

## 2021-04-29 NOTE — P.CONS
History of Present Illness





- Reason for Consult


Consult date: 21


Laryngeal cancer - metastatic


Requesting physician: Mia Kemp





- History of Present Illness





Nikolai is being evaluated after diagnosis of squamous cell cancer of Larynx. He 

presented with dysphona and feeling of a "lump: in L cervical area. He had CT 

scan of neck and referred to Dr Fuentes, biopsy of left subglotic mass, as

well as, L vocal cord revealed well differentiated squamous cell carcinoma.


He was evaluated by Dr Perez , PET Scan performed at [facility] revealing 

increased uptake at at L vocal cord, but not subglotic mass.


He denied anorexia or weight loss, smoked 1 PPD X 30 years, quit smoking 20 

years ago, he consumes 3-4 beers daily, he worked in a [employer].


20: Had total laryngectomy with bilateral neck disections on 20 (Dr Perez) at [facility] : T3 (2.7X1.3X0.7cm Ca) found, margins negative, all LN 

negative (67) .


He was given adjuvant Radtiaon therapy (Dr Villarreal).


When seen today, he is C/O difficulty clearing throught with thick mucus, as 

well as, bilateral edema in submandibular area.


20: Had PET Scan : new R hilar mass with mediastinal lymphadenopathy, 

suspected liver & bone mets > had diagnostic bronchoscopy by Dr Lieberman on 

20 revealing small cell carcinoma !!.


He is C/O fatigue & SOB, no hemoptysis.


20: Status post cycle one carbo/ [id number].


20: Tolerated cycle # 1 of Carboplatinum/Etoposide/Tecentriq Chemotherapy 

very well > very mild nausea X 1 > developing alopecia


20: Feels well, tolerating Chemotherapy well (minimal grade I nausea), 

mild SOB


20: PET Scan 20 with response to treatment evenced by marked 

interval improvement in hilar and thoracic adenopathy, as well as, no areas of 

abnormal hypermetabolic uptake. Interval progression of osseous disease without 

abnormal uptake. He will continue on immune therapy. Couple bouts of diarrhea 

after fast food but improved.


21: Feels well, tolerating Tecentriq well, last PET Scan: complete 

metabolic response


3/26/21: Feels well, C/O R sided headaches X 3 weeks, no visual symptoms or loss

of balance





Last Tecentriq . He now presents to hospital after fall. 





Review of Systems


All systems: negative


Constitutional: Reports as per HPI





Past Medical History


Past Medical History: Cancer, Hypertension, Thyroid Disorder


Additional Past Medical History / Comment(s): CURRENT: PET SCAN SHOWS POSSIBLE 

METASTATIC LESION IN RIGHT CENTRAL LUNG; PATIENT HAS HAD A COUGH FOR SEVERAL 

WEEKS.   , GLOTTIS CANCER (SQUAMOUS CELL).  MULTIPLE SKIN CANCERS RIGHT 

CHEST, LEFT ELBOW,   RIGHT ELBOW.


History of Any Multi-Drug Resistant Organisms: None Reported


Additional Past Surgical History / Comment(s): 2020 LARYNGECTOMY 

(TRACH), PARTIAL RESECTION OF NASAL AND THROAT ARE & THYROIDECTOMY @ [facility].  PROSTHETIC USED TO HELP HIM TO TALK.  ABLE TO EAT ORALLY.


Past Anesthesia/Blood Transfusion Reactions: No Reported Reaction


Past Psychological History: No Psychological Hx Reported


Smoking Status: Former smoker


Past Alcohol Use History: Daily


Past Drug Use History: None Reported





- Past Family History


  ** Father


Family Medical History: Myocardial Infarction (MI)


Additional Family Medical History / Comment(s): Father  of a MI at the age 

of 58yrs.





  ** Mother


Family Medical History: CVA/TIA


Additional Family Medical History / Comment(s): Mother is alive and is 96yrs 

old.





Medications and Allergies


                                Home Medications











 Medication  Instructions  Recorded  Confirmed  Type


 


Atenolol [Tenormin] 50 mg PO DAILY 20 History


 


Dexamethasone [Decadron] 8 mg PO AS DIRECTED 21 History


 


Dicyclomine [Bentyl] 10 mg PO TID 21 History


 


Levothyroxine Sodium 150 mcg PO DAILY 21 History








                                    Allergies











Allergy/AdvReac Type Severity Reaction Status Date / Time


 


No Known Allergies Allergy   Verified 21 12:10














Physical Exam


Vitals: 


                                   Vital Signs











  Temp Pulse Pulse Resp BP BP BP


 


 21 07:30  97.9 F   50 L  16    165/74


 


 21 05:29  97.7 F   54 L  18   155/70 


 


 21 20:00  97.8 F   56 L  18   143/79 


 


 21 14:00  98 F   56 L  17   148/86 


 


 21 10:54  98.2 F  52 L   18  164/82  














  Pulse Ox


 


 21 07:30  97


 


 21 05:29  98


 


 21 20:00  96


 


 21 14:00  100


 


 21 10:54  97








                                Intake and Output











 21





 22:59 06:59 14:59


 


Intake Total 75  


 


Balance 75  


 


Intake:   


 


  Intake, IV Titration 75  





  Amount   


 


    Sodium Chloride 0.9% 1, 75  





    000 ml @ 75 mls/hr IV .   





    [id number] [facility] Rx#:[id number]   


 


Other:   


 


  # Voids  3 


 


  # Bowel Movements  1 


 


  Weight 77.564 kg  














- Constitutional


General appearance: cooperative, no acute distress





- EENT


Eyes: abnormal pupil


ENT: hard of hearing, NA/AT





- Neck


Neck: normal ROM





- Respiratory


Respiratory: bilateral: diminished





- Cardiovascular


Rhythm: regularly irregular





- Gastrointestinal


General gastrointestinal: normal bowel sounds





- Integumentary


Integumentary: pale





- Neurologic





non focal





- Musculoskeletal


Musculoskeletal: generalized weakness





- Psychiatric


Psychiatric: appropriate affect





Results


CBC & Chem 7: 


                                 21 07:22





                                 21 11:34


Labs: 


                  Abnormal Lab Results - Last 24 Hours (Table)











  21 Range/Units





  11:34 11:34 07:22 


 


RDW  16.1 H   15.7 H  (11.5-15.5)  %


 


Neutrophils #  7.9 H    (1.3-7.7)  k/uL


 


Lymphocytes #  0.6 L    (1.0-4.8)  k/uL


 


Sodium   134 L   (137-145)  mmol/L


 


BUN   28 H   (9-20)  mg/dL


 


Glucose   160 H   (74-99)  mg/dL


 


Total Protein   6.1 L   (6.3-8.2)  g/dL











Comments: 





MRI Brain





Assessment and Plan


(1) Head and neck cancer


Current Visit: Yes   Status: Acute   Code(s): C76.0 - MALIGNANT NEOPLASM OF 

HEAD, FACE AND NECK   SNOMED Code(s): 836903209


   





(2) Frequent falls


Current Visit: Yes   Status: Acute   Code(s): R29.6 - REPEATED FALLS   SNOMED 

Code(s): 693344844


   


Plan: 





Assessment and Recommendations:





Metastatic Cancer to brain:


 - Overall most lesions have decreased however there is one that has active 

progression with concern of hemorrhagic


 - Dex and add PPI


 - XRT consult placed


 - Neurology is following





Physician attest: I have completed full history and physical and agree with 

above dictation, dictated as a scribe

## 2021-04-29 NOTE — P.PN
Subjective


Progress Note Date: 04/29/21


Patient was seen at bedside and wife accompanied and she stated the patient had 

to use the bathroom fast so he walked unassisted and he fell but did not lose 

consciousness or any trauma to head.  Denies of any further weakness, numbness, 

difficulty getting his words out with swallowing.





MRI the brain is reported as numerous enhancing masses throughout the brain 

which overall are decreased in size compared to the previous exam of 03/29/2021 

and consistent with cerebral treatment response.  The only lesion that is 

increased in size is in the left caudate nucleus which measures 1.5 cm on the 

previous exam and now measures 2.7 cm.  This could be hemorrhagic





Objective





- Vital Signs


Vital signs: 


                                   Vital Signs











Temp  97.9 F   04/29/21 07:30


 


Pulse  50 L  04/29/21 07:30


 


Resp  16   04/29/21 07:30


 


BP  165/74   04/29/21 07:30


 


Pulse Ox  97   04/29/21 07:30








                                 Intake & Output











 04/28/21 04/29/21 04/29/21





 18:59 06:59 18:59


 


Intake Total 75  


 


Balance 75  


 


Weight 77.564 kg  


 


Intake:   


 


  Intake, IV Titration 75  





  Amount   


 


    Sodium Chloride 0.9% 1, 75  





    000 ml @ 75 mls/hr IV .   





    [id number] [facility] Rx#:[id number]   


 


Other:   


 


  # Voids  3 


 


  # Bowel Movements  1 














- Exam


GENERAL: The patient is lying in bed and is not in acute distress.





NEUROLOGICAL:


Higher mental function: The patient is awake, alert, oriented to self, place and

time.  Patient is following commands.  Communicated with person he verbal on 

rare occasional otherwise nods and follows commands appropriately.


Cranial nerves: The pupils are round, equal and reactive to light and 

accommodation.  Visual fields are full to confrontation throughout.  Extraocular

movement is intact no nystagmus is noted.  Facial sensation is normal to touch 

throughout.  The facial strength is normal throughout.  Hearing is normal 

bilaterally to hand rub.  Tongue is midline and moved side-to-side without any 

difficulty.  Shoulder shrug is normal bilaterally.


Motor: Gait: Was leaning towards the right upon walking.  The strength is 5-/5 

over the right knee extension.  Otherwise 5 over 5 throughout.  Normal tone and 

bulk.  


Cerebellum: Normal finger to nose heel to shin bilaterally.


Sensation: Sensation is normal to touch throughout.


Reflexes (right/left): 2+


Plantars are downgoing bilaterally. 








- Labs


CBC & Chem 7: 


                                 04/29/21 07:22





                                 04/28/21 11:34


Labs: 


                  Abnormal Lab Results - Last 24 Hours (Table)











  04/29/21 Range/Units





  07:22 


 


RDW  15.7 H  (11.5-15.5)  %














Assessment and Plan


Assessment: 





Recurrent falls due to right legs weakness and brainstem lesion causing unsteady

gait from multiple brain metastasis


Numerous Hemorrhagic Brain metastatsis getting radiation therapy (decreased in 

size compared to previous Imaging 03/29/2021 except left caudate nulceus 

increased from 1.5cm to 2.7cm).


Per CT has hyperdense suggestive of either intrcranial hemorrhage vs hemorrhagic

metastasis.  Pending MRI Brain.


History of small cell lung  (diagnosed 08/2020)


History of laryngeal cancer (diagnosed 01/2020)


Tracheostomy


Ex-tobacco use


Plan: 





* CT of the head is reported as numerous intracranial lesion largest seen in the

  left frontal horn.  They appeared to be hyper dense suggestive of either 

  intracranial hemorrhage or hemorrhagic metastasis.  The largest lesion seen in

  the left frontal lobe measuring 2.1 cm and that compresses the left frontal 

  horn.  On a recent previous MRI measured 1.3 cm.  This could represent 

  enlargement of the previous noted metastasis or hemorrhage associate with 

  known metastasis increasing the lesion in size.  The ventricular size appears 

  to be enlarged and greater centrally.  This could be on the basis of 

  degenerative change although there is greater central component.  This raises 

  the possibility of a degree of hydrocephalus.In the body it is mentioned there

  is numerous rounded hyper density seen involving the brainstem, cerebellar and

  the cerebrum with the largest seen involving the frontal compressing the fron

  caleb horn.  Dilation of the ventricle system is seen that.  A left temporal 

  lesion also noted with additional small lesion in the left frontal lobe.


* MRI the brain is reported as numerous enhancing masses throughout the brain 

  which overall are decreased in size compared to the previous exam of 03/29/ 2021 and consistent with cerebral treatment response.  The only lesion that is

  increased in size is in the left caudate nucleus which measures 1.5 cm on the 

  previous exam and now measures 2.7 cm.  This could be hemorrhagic.





* I ordered a prolonged EEG (2.5 hours) to rule out subclinical seizure.


* Continue Keppra 500mg 1 tab bid (initially wanted him to be on Keppra 750mg 

  because of body weight but he wants to be on 500mg instead) for seizure 

  prophylaxis especially with multiple brain lesion.  The patient was notified 

  of side-effects of medication (agitation, irritable, waters).  Patient will try

  medication for 1 month and if he has any side-effects then avoid Keppra and 

  start the patient on Vimpat 50mg 1 tab bid.


* Recommend SBP <140 because of brain metastasis and will defer management to 

  the primary team.


* Physical therapy and occupation therapy are consulted.


* Dr. tara Villarreal (Radiation Oncologist is consulted).


* Upon discharge the patient needs to follow-up with his Oncologist.


* Recommend restarting Decadron 8 mg daily.


* Patient does not want inpatient physical therapy and rather home therapy.


* Recommend the patient to follow-up with Neurologist and neurosurgeon as 

  outpatient upon discharge within 1-2 weeks





The plan is discussed with the patient's wife and his nurse.





Nolberto Martel MD


Neuro-Hospitalist





Time with Patient: Less than 30

## 2021-04-30 VITALS
RESPIRATION RATE: 19 BRPM | HEART RATE: 66 BPM | DIASTOLIC BLOOD PRESSURE: 79 MMHG | TEMPERATURE: 97.7 F | SYSTOLIC BLOOD PRESSURE: 130 MMHG

## 2021-04-30 RX ADMIN — DEXAMETHASONE SODIUM PHOSPHATE SCH MG: 4 INJECTION, SOLUTION INTRAMUSCULAR; INTRAVENOUS at 13:11

## 2021-04-30 RX ADMIN — PANTOPRAZOLE SODIUM SCH MG: 40 TABLET, DELAYED RELEASE ORAL at 08:36

## 2021-04-30 RX ADMIN — CEFAZOLIN SCH: 330 INJECTION, POWDER, FOR SOLUTION INTRAMUSCULAR; INTRAVENOUS at 11:00

## 2021-04-30 RX ADMIN — DEXAMETHASONE SODIUM PHOSPHATE SCH MG: 4 INJECTION, SOLUTION INTRAMUSCULAR; INTRAVENOUS at 06:06

## 2021-04-30 RX ADMIN — CEFAZOLIN SCH MLS/HR: 330 INJECTION, POWDER, FOR SOLUTION INTRAMUSCULAR; INTRAVENOUS at 00:49

## 2021-04-30 RX ADMIN — LEVOTHYROXINE SODIUM SCH MCG: 75 TABLET ORAL at 06:08

## 2021-04-30 RX ADMIN — DEXAMETHASONE SODIUM PHOSPHATE SCH MG: 4 INJECTION, SOLUTION INTRAMUSCULAR; INTRAVENOUS at 00:48

## 2021-04-30 NOTE — P.DS
Providers


Date of admission: 


04/28/21 12:59





Expected date of discharge: 04/30/21


Attending physician: 


Mia Kemp





Consults: 





                                        





04/28/21 13:02


Consult Physician Urgent 


   Consulting Provider: Nolberto Martel


   Consult Reason/Comments: Frequent falls, current brain cancer


   Do you want consulting provider notified?: Yes


Consult Physician Urgent 


   Consulting Provider: Rik Villarreal


   Consult Reason/Comments: Frequent falls, brain cancer


   Do you want consulting provider notified?: Already Contacted





04/28/21 16:54


Consult Physician Routine 


   Consulting Provider: Raymundo Fleming


   Consult Reason/Comments: metastatic cancer


   Do you want consulting provider notified?: Yes











Primary care physician: 


[facility]





Hospital Course: 











Final diagnosis








-Recurrent falls and right leg weakness secondary to brainstem lesion and 

multiple metastatic lesions in the brain from small cell lung cancer


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

an ostomy in the trachea..  Patient used to be a smoker in the past.


-Hyponatremia possibly hypovolemic hyponatremia or SIADH from small cell lung 

cancer.


-Hypertension 


-hyperthyroidism


-DVT prophylaxis





Discharge disposition


Patient is being discharged in a stable condition with guarded prognosis to 

home.  Patient will follow-up with [facility] upon discharge.  [facility] 

[facility].  Patient also 

instructed to follow-up with radiation oncology, oncology, neurology, and 

neurosurgery in the outpatient setting.  Patient will continue on a long Decadro

n taper.  Total time taken is greater than 35 minutes.





Hospital course





71-year-old the male with history of small cell lung cancer and's, Paula 

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 

radiation therapy has been falling lately and was also complained of weakness 

predominantly in the right hand and leg there is no obvious clinically 

appreciable weakness.  Patient the does use a walker.  Patient has been falling 

recently.  Patient also has some forgetfulness.  Patient denied any seizures 

patient had 2 falls yesterday has seen radiation oncologist who sent him to ER 

and patient.  Patient had a CT of the head which showed numerous intracranial 

lesions apparently these lesions were present in the previous MRI as well the 

recent previous MRI showed 1.3 cm metastatic lesions and the present computed 

tomography scan showed 2.1 cm left frontal lobe lesion with possible 

intralesional hemorrhage.  There are also multiple other metastatic lesions in 

the brainstem cerebellar and cerebral areas.  Sodium is bit low.





04/29/2021





Patient is seen in follow-up and per nursing staff was attempting to get out of 

the chair to go to the bathroom and soiled himself and fell by sliding down onto

his butt.  Patient denies any head injury or hitting anything and denies any 

pain at this time.  Neurology and oncology following the patient is scheduled to

undergo EEG monitoring today.  Sodium was 132 yesterday and patient was placed 

on IV fluids and repeat labs continue to be pending at this time.








04/30/2021


Patient is seen in follow-up with no acute overnight issues.  Patient was being 

followed by radiation oncology along with oncology and neurology and 

recommending outpatient follow-up with neurology and neurosurgery as soon as 

possible, radiation oncology in 2-3 weeks as discussed and scheduled and 

continue with oncology in the outpatient setting.  Patient is going home and 

would benefit from Homecare along with palliative care and referrals and documen

tation was placed as [facility].  

Patient will be continued on Keppra 500 mg twice daily upon discharge as 

discussed with neurology and will need close neurology and neurosurgery 

outpatient follow-up.  Currently no reports of chest pain, worsening shortness 

of breath, or palpitations.  Patient is afebrile.  No reports of nausea or 

vomiting and patient is tolerating diet.  Patient will be discharged home today.

 [facility] care along with [facility].  Guarded

prognosis.





On exam vital signs are stable.  Cardio S1, S2 are muffled.  Respiratory shows 

diminished breath sounds at the bases with no wheezing or rhonchi noted. Abdomen

is soft and nontender.  Nervous system shows no focal deficits.





Please refer to medication reconciliation sheet for a list of medications.





Patient Condition at Discharge: Fair





Plan - Discharge Summary


Discharge Rx Participant: No


New Discharge Prescriptions: 


New


   levETIRAcetam [Keppra] 500 mg PO Q12HR 14 Days #28 tab


   Pantoprazole [Protonix] 40 mg PO AC-BID 14 Days #28 tablet.


   Acetaminophen Tab [Tylenol] 650 mg PO Q6HR PRN  tab


     PRN Reason: Mild Pain Or Fever > 100.5


   levETIRAcetam [Keppra] 500 mg PO BID 30 Days #60 tab


   Dexamethasone [Decadron] 4 mg PO TID #21 tablet





Continue


   Dicyclomine [Bentyl] 10 mg PO TID


   Levothyroxine Sodium 150 mcg PO DAILY





Changed


   Dexamethasone [Decadron] 4 mg PO TID #60 tab





Discontinued


   Atenolol [Tenormin] 50 mg PO DAILY


Discharge Medication List





Dicyclomine [Bentyl] 10 mg PO TID 04/28/21 [History]


Levothyroxine Sodium 150 mcg PO DAILY 04/28/21 [History]


Acetaminophen Tab [Tylenol] 650 mg PO Q6HR PRN  tab 04/30/21 [Rx]


Dexamethasone [Decadron] 4 mg PO TID #21 tablet 04/30/21 [Rx]


Dexamethasone [Decadron] 4 mg PO TID #60 tab 04/30/21 [Rx]


Pantoprazole [Protonix] 40 mg PO AC-BID 14 Days #28 tablet. 04/30/21 [Rx]


levETIRAcetam [Keppra] 500 mg PO BID 30 Days #60 tab 04/30/21 [Rx]


levETIRAcetam [Keppra] 500 mg PO Q12HR 14 Days #28 tab 04/30/21 [Rx]








Follow up Appointment(s)/Referral(s): 


Yevgeniy Rodriguez MD [STAFF PHYSICIAN] - 1 Week (Patients wife having information 

faxed to Dr. North office. They will review and be in contact with follow-up 

appt. )


Tavia Zamora MD [REFERRING] - 1 Week (Patients wife having information 

faxed to Dr. Garcia office. They will review and be in contact with follow-up 

appt. )


Rik Villarreal MD [STAFF PHYSICIAN] - 05/12/21 11:00 am


William Winter MD [STAFF PHYSICIAN] - 05/05/21 2:15 pm


()


[facility] [Primary Care Provider] - 1-2 days


Patient Instructions/Handouts:  Dexamethasone (By mouth), Pantoprazole (By 

mouth), Levetiracetam (By mouth), Fall Prevention for Older Adults (DC)


Activity/Diet/Wound Care/Special Instructions: 


Follow-up appts. with Neurology and Neurosurgeon next week per Dr. Martel(Neuro).





Activity Limited until follow-up


Continue Decadron with a slow taper


Continue with Decadron 4 mg 3 times daily for 1 week, Decadron 3 mg 3 times 

daily for 1 week, Decadron 2 mg 3 times daily for 1 week


Continue with Keppra 500 mg twice daily





Continue current diet





[facility].


[facility] with [facility] to set up any additional services 

that may be needed. 





Discharge Disposition: HOME WITH HOME HEALTH SERVICES

## 2021-04-30 NOTE — P.PN
Subjective


Progress Note Date: 04/30/21





increased lesion in brain, awaiting xrt to eval





Objective





- Vital Signs


Vital signs: 


                                   Vital Signs











Temp  97.8 F   04/30/21 06:59


 


Pulse  58 L  04/30/21 08:42


 


Resp  16   04/30/21 08:42


 


BP  119/69   04/30/21 06:59


 


Pulse Ox  98   04/30/21 06:59








                                 Intake & Output











 04/29/21 04/30/21 04/30/21





 18:59 06:59 18:59


 


Intake Total 900  


 


Balance 900  


 


Intake:   


 


  Intake, IV Titration 900  





  Amount   


 


    Sodium Chloride 0.9% 1, 900  





    000 ml @ 75 mls/hr IV .   





    [id number] [facility] Rx#:[id number]   


 


Other:   


 


  Voiding Method  Toilet Toilet





  Urinal Urinal


 


  # Voids 4  


 


  # Bowel Movements 1  














- Exam





- Constitutional


General appearance: cooperative, no acute distress





- EENT


Eyes: abnormal pupil


ENT: hard of hearing, NA/AT





- Neck


Neck: normal ROM





- Respiratory


Respiratory: bilateral: diminished





- Cardiovascular


Rhythm: regularly irregular





- Gastrointestinal


General gastrointestinal: normal bowel sounds





- Integumentary


Integumentary: pale





- Neurologic





non focal





- Musculoskeletal


Musculoskeletal: generalized weakness





- Psychiatric


Psychiatric: appropriate affect





- Labs


CBC & Chem 7: 


                                 04/29/21 07:22





                                 04/29/21 07:22


Labs: 


                  Abnormal Lab Results - Last 24 Hours (Table)











  04/29/21 Range/Units





  07:22 


 


BUN  28.0 H  (9.0-27.0)  mg/dL


 


BUN/Creatinine Ratio  35.00 H  (12.00-20.00)  Ratio


 


Glucose  160 H  ()  mg/dL














Assessment and Plan


(1) Head and neck cancer


Status: Acute   Code(s): C76.0 - MALIGNANT NEOPLASM OF HEAD, FACE AND NECK   

SNOMED Code(s): 321403205


   





(2) Frequent falls


Status: Acute   Code(s): R29.6 - REPEATED FALLS   SNOMED Code(s): 209911181


   


Plan: 





Assessment and Recommendations:





Metastatic Cancer to brain:


 - Overall most lesions have decreased however there is one that has active 

progression with concern of hemorrhagic


 - Dex and add PPI


 - XRT consult placed


 - Neurology is following


 - Await recommendations from Radiation Oncology





Hold chemo until seen and re-evaluated by primary onc Dr. Winter





Physician Attest: I have completed full history and physical developed above 

impression and plan, agree with dictation, dictated as a scribe

## 2021-04-30 NOTE — EEG
ELECTROENCEPHALOGRAM REPORT



PROCEDURE DATE:

04/29/2021



ELECTROENCEPHALOGRAM (EEG) REPORT:



TECHNIQUE:

This is a report from a continuous 2-1/2 hour 18-channel digital video EEG performed

using the 10/20 international electrode placement system.



HISTORY:

Lung cancer, brain metastasis.



CURRENT MEDICATIONS:

Unknown.



FINDINGS:

Recording start time:  4/29/2021 at 2:24 p.m.

Recording end time:  4/29/2021 at 5:19 p.m.



EVENTS:

During this 2-1/2 hour prolonged video EEG, no clinical or electrographic seizures were

recorded.



BACKGROUND:

The background activity consisted of unsustained 7 to 8 hertz rhythmic waveforms with

symmetric theta range slowing.



ACTIVATION:

Hyperventilation:  Not performed.



Photic stimulation:  No driving seen.



Sleep:  Stages I and II sleep noted.



ABNORMALITIES:

1. Occasional frontally predominant delta range slowing was seen.

2. Intermittent diffuse 4-6 hertz theta range slowing was seen.



IMPRESSION:

Abnormal 2-1/2 hour prolonged digital video EEG.  No clinical or electrographic

seizures were recorded.  No epileptiform activity was present.  The frontally

predominant delta range slowing mentioned above is not epileptiform in nature.  The

diffuse theta range slowing mentioned above is not epileptiform in nature.  In

combination, these findings indicate mild to moderate diffuse cerebral dysfunction as

may be seen in a toxometabolic encephalopathy.  No seizures were recorded.  No

epileptiform activity was present.





MMODL / IJN: [id number] / Job#: [id number]

## 2021-04-30 NOTE — P.CONS
History of Present Illness





- Reason for Consult


Consult date: 21


brain metastases - weakness/falls


Requesting physician: Nolberto Martel





- Chief Complaint


falls, feeling wobbly





- History of Present Illness


The patient is a 70-year-old male presenting with a locally advanced squamous 

cell carcinoma of the larynx, he is status post total laryngectomy and bilateral

lymph node dissection with final pathology revealing a stage III (pT3, pN0, M0) 

squamous cell carcinoma the larynx with subglottic extension and lymphovascular 

space invasion.  He underwent a course of adjuvant radiation finishing on May 

21, 2020.  Shortly after his treatment, he was unfortunately diagnosed with 

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  He has undergone 

chemoimmunotherapy with excellent response.  He now presents with brain metastas

es and underwent WBRT finishing on 2021.  





The patient was reportedly doing well until approximately one week after 

completing his radiotherapy, when he developed difficulty with lower extremity 

weakness, falls and headache.  At this time, the patient had been weaned down to

4 mg daily on his Decadron.  This past Monday, the patient's wife increase his 

Decadron to 4 mg twice daily, which resulted in resolution of his headaches.  He

was evaluated in the clinic on Wednesday after have been reported that he was 

having significant difficulty with falls at home.  The patient has not been comp

liant when family has requested that he use a walker.  Considering this 

progression of his symptoms, he was recommended to be evaluated in the emergency

room.





The patient was admitted on 2021.  He initially underwent a CT brain 

and subsequent MRI.  This showed significant improvement in nearly all of the 

previously noted intracranial disease.  However, one lesion along the left 

caudate nucleus had increased in size from 1.5 to 2.7 cm with possible 

hemorrhagic change.  Although not specifically mentioned on the MRI, the 

patient's CT scan does describe some central hydrocephalus.  The patient has not

had any difficulty with loss of urinary control.  He subsequently has had his 

steroids increased again to 4 times a day.





On examination today () the patient states he is feeling better.  He reports

no difficulty with headache or nausea at this time.  He had been up walking with

physical therapy yesterday, but noted he still felt a little bit wobbly.  He has

been evaluated by neurology, and they have recommended outpatient neurology and 

neurosurgical evaluation.














Review of Systems


Constitutional: Denies chills, Denies fever


Eyes: denies blurred vision


Ears, nose, mouth and throat: Denies dysphagia


Cardiovascular: Denies chest pain


Respiratory: Denies cough


Gastrointestinal: Denies constipation


Genitourinary: Denies incontinence


Musculoskeletal: Denies low back pain


Integumentary: Denies rash


Neurological: Reports balance difficulties, Reports gait dysfunction, Reports 

memory loss, Denies aphasia, Denies burning pain, Denies confusion, Denies 

convulsions, Denies double vision, Denies headaches


Psychiatric: Denies anxiety, Denies depression





Past Medical History


Past Medical History: Cancer, Hypertension, Thyroid Disorder


Additional Past Medical History / Comment(s): CURRENT: PET SCAN SHOWS POSSIBLE 

METASTATIC LESION IN RIGHT CENTRAL LUNG; PATIENT HAS HAD A COUGH FOR SEVERAL 

WEEKS.   , GLOTTIS CANCER (SQUAMOUS CELL).  MULTIPLE SKIN CANCERS RIGHT 

CHEST, LEFT ELBOW,   RIGHT ELBOW.


History of Any Multi-Drug Resistant Organisms: None Reported


Additional Past Surgical History / Comment(s): 2020 LARYNGECTOMY 

(TRACH), PARTIAL RESECTION OF NASAL AND THROAT ARE & THYROIDECTOMY @ [facility].  PROSTHETIC USED TO HELP HIM TO TALK.  ABLE TO EAT ORALLY.


Past Anesthesia/Blood Transfusion Reactions: No Reported Reaction


Past Psychological History: No Psychological Hx Reported


Smoking Status: Former smoker


Past Alcohol Use History: Daily


Past Drug Use History: None Reported





- Past Family History


  ** Father


Family Medical History: Myocardial Infarction (MI)


Additional Family Medical History / Comment(s): Father  of a MI at the age 

of 58yrs.





  ** Mother


Family Medical History: CVA/TIA


Additional Family Medical History / Comment(s): Mother is alive and is 96yrs 

old.





Medications and Allergies


                                Home Medications











 Medication  Instructions  Recorded  Confirmed  Type


 


Dicyclomine [Bentyl] 10 mg PO TID 21 History


 


Levothyroxine Sodium 150 mcg PO DAILY 21 History


 


Acetaminophen Tab [Tylenol] 650 mg PO Q6HR PRN  tab 21  Rx


 


Dexamethasone [Decadron] 4 mg PO TID #21 tablet 21  Rx


 


Dexamethasone [Decadron] 4 mg PO TID #60 tab 21  Rx


 


Pantoprazole [Protonix] 40 mg PO AC-BID 14 Days #28 21  Rx





 tablet.   


 


levETIRAcetam [Keppra] 500 mg PO BID 30 Days #60 tab 21  Rx


 


levETIRAcetam [Keppra] 500 mg PO Q12HR 14 Days #28 tab 21  Rx








                                    Allergies











Allergy/AdvReac Type Severity Reaction Status Date / Time


 


No Known Allergies Allergy   Verified 21 12:10














Physical Exam


Vitals: 


                                   Vital Signs











  Temp Pulse Resp BP BP Pulse Ox


 


 21 12:45  97.7 F  66  19   130/79  96


 


 21 08:42   58 L  16   


 


 21 06:59  97.8 F  58 L  16  119/69   98


 


 21 05:00  98.1 F  52 L  16   135/68  96


 


 21 20:50  98.3 F  59 L  18   169/89  97


 


 21 20:00   59 L  18   








                                Intake and Output











 21





 22:59 06:59 14:59


 


Intake Total 900  


 


Balance 900  


 


Intake:   


 


  Intake, IV Titration 900  





  Amount   


 


    Sodium Chloride 0.9% 1, 900  





    000 ml @ 75 mls/hr IV .   





    [id number] [facility] Rx#:[id number]   


 


Other:   


 


  Voiding Method Toilet  Toilet





 Urinal  Urinal


 


  # Voids 4  


 


  # Bowel Movements 1  














- Constitutional


General appearance: no acute distress





- EENT


Eyes: EOMI, PERRLA


ENT: NA/AT





- Neck


Neck: no lymphadenopathy





- Respiratory


Respiratory: bilateral: CTA





- Cardiovascular


Rhythm: regular





- Gastrointestinal


General gastrointestinal: no distended, no tenderness





- Integumentary


Integumentary: no calor





- Neurologic


Neurologic: CNII-XII intact





- Musculoskeletal


Musculoskeletal: strength equal bilaterally





- Psychiatric


Psychiatric: A&O x's 3, appropriate affect





Results


CBC & Chem 7: 


                                 21 07:22





                                 21 07:22


Labs: 


                  Abnormal Lab Results - Last 24 Hours (Table)











  21 Range/Units





  07:22 


 


BUN  28.0 H  (9.0-27.0)  mg/dL


 


BUN/Creatinine Ratio  35.00 H  (12.00-20.00)  Ratio


 


Glucose  160 H  ()  mg/dL











CT Scan - head: report reviewed, image reviewed


MRI - head: report reviewed, image reviewed





Assessment and Plan


Assessment: 


The patient is a 70-year-old male presenting with a locally advanced squamous 

cell carcinoma of the larynx, he is status post total laryngectomy and bilateral

 lymph node dissection with final pathology revealing a stage III (pT3, pN0, M0)

 squamous cell carcinoma the larynx with subglottic extension and lymphovascular

 space invasion.  He underwent a course of adjuvant radiation finishing on May 

21, 2020.  Shortly after his treatment, he was unfortunately diagnosed with 

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  He has undergone 

chemoimmunotherapy with excellent response.  He now presents with brain 

metastases and underwent WBRT finishing on 2021.  He has been hospitalized 

due to weakness/falls. 





Plan: 


1.  Brain metastases:  As detailed above, the patient's metastatic disease has 

responded quite well to radiotherapy.  One lesion along the left caudate seemed 

to enlarge, but this is likely based on hemorrhagic conversion as opposed to 

disease progression.  I will recommend the patient undergo repeat MRI in 

approximately 6 weeks to reevaluate.  I do not feel there is a need for further 

radiation at this time.  Instructions to taper Decadron to 4 mg TID upon 

discharge and subsequently every 7 days decrease by 1 tablet were given.  We 

will schedule him for outpatient evaluation in 2 weeks.





2.  Hydrocephalus:   The patient does appear to have hydrocephalus, however I 

did discuss his case with radiology.  They feel that none of the lesions appear 

to be obstructing outflow.  This raises the possibility of NPH.  Although the 

patient is not having all of the typical symptoms of hydrocephalus, this 

certainly could be contributing to his gait disturbance.  Neurosurgical 

outpatient evaluation is reasonable.  





Time with Patient: Greater than 30

## 2021-04-30 NOTE — P.PN
Subjective


Progress Note Date: 04/30/21


Patient was seen at bedside and he stated that he is doing fairly well.  Denies 

any worsening of his condition.














Objective





- Vital Signs


Vital signs: 


                                   Vital Signs











Temp  97.7 F   04/30/21 12:45


 


Pulse  66   04/30/21 12:45


 


Resp  19   04/30/21 12:45


 


BP  130/79   04/30/21 12:45


 


Pulse Ox  96   04/30/21 12:45








                                 Intake & Output











 04/29/21 04/30/21 04/30/21





 18:59 06:59 18:59


 


Intake Total 900  


 


Balance 900  


 


Intake:   


 


  Intake, IV Titration 900  





  Amount   


 


    Sodium Chloride 0.9% 1, 900  





    000 ml @ 75 mls/hr IV .   





    [id number] [facility] Rx#:[id number]   


 


Other:   


 


  Voiding Method  Toilet Toilet





  Urinal Urinal


 


  # Voids 4  


 


  # Bowel Movements 1  














- Exam


GENERAL: The patient is lying in bed and is not in acute distress.





NEUROLOGICAL:


Higher mental function: The patient is awake, alert, oriented to self, place and

time.  Patient is following commands.  Communicated with person he verbal on 

rare occasional otherwise nods and follows commands appropriately.


Cranial nerves: The pupils are round, equal and reactive to light and 

accommodation.  Visual fields are full to confrontation throughout.  Extraocular

movement is intact no nystagmus is noted.  Facial sensation is normal to touch 

throughout.  The facial strength is normal throughout.  Hearing is normal 

bilaterally to hand rub.  Tongue is midline and moved side-to-side without any 

difficulty.  Shoulder shrug is normal bilaterally.


Motor: Gait: was deferred.  The strength is 5-/5 over the right knee extension. 

Otherwise 5 over 5 throughout.  Normal tone and bulk.  


Cerebellum: Normal finger to nose heel to shin bilaterally.


Sensation: Sensation is normal to touch throughout.


Reflexes (right/left): 2+


Plantars are downgoing bilaterally. 








- Labs


CBC & Chem 7: 


                                 04/29/21 07:22





                                 04/29/21 07:22


Labs: 


                  Abnormal Lab Results - Last 24 Hours (Table)











  04/29/21 Range/Units





  07:22 


 


BUN  28.0 H  (9.0-27.0)  mg/dL


 


BUN/Creatinine Ratio  35.00 H  (12.00-20.00)  Ratio


 


Glucose  160 H  ()  mg/dL














Assessment and Plan


Assessment: 





Recurrent falls due to right legs weakness and brainstem lesion causing unsteady

gait from multiple brain metastasis


Numerous Hemorrhagic Brain metastatsis getting radiation therapy (decreased in 

size compared to previous Imaging 03/29/2021 except left caudate nulceus 

increased from 1.5cm to 2.7cm).


Per CT has hyperdense suggestive of either intrcranial hemorrhage vs hemorrhagic

metastasis.  Pending MRI Brain.


History of small cell lung  (diagnosed 08/2020)


History of laryngeal cancer (diagnosed 01/2020)


Tracheostomy


Ex-tobacco use


Plan: 





* CT of the head is reported as numerous intracranial lesion largest seen in the

  left frontal horn.  They appeared to be hyper dense suggestive of either 

  intracranial hemorrhage or hemorrhagic metastasis.  The largest lesion seen in

  the left frontal lobe measuring 2.1 cm and that compresses the left frontal 

  horn.  On a recent previous MRI measured 1.3 cm.  This could represent 

  enlargement of the previous noted metastasis or hemorrhage associate with 

  known metastasis increasing the lesion in size.  The ventricular size appears 

  to be enlarged and greater centrally.  This could be on the basis of 

  degenerative change although there is greater central component.  This raises 

  the possibility of a degree of hydrocephalus.In the body it is mentioned there

  is numerous rounded hyper density seen involving the brainstem, cerebellar and

  the cerebrum with the largest seen involving the frontal compressing the 

  frontal horn.  Dilation of the ventricle system is seen that.  A left temporal

  lesion also noted with additional small lesion in the left frontal lobe.


* MRI the brain is reported as numerous enhancing masses throughout the brain 

  which overall are decreased in size compared to the previous exam of 

  03/29/2021 and consistent with cerebral treatment response.  The only lesion 

  that is increased in size is in the left caudate nucleus which measures 1.5 cm

  on the previous exam and now measures 2.7 cm.  This could be hemorrhagic.


* Patient had a prolonged routine (2.5 hours) on 04/29/2021 and I was notified 

  by the Dr. Talbert (Epilepsy attending that read EEG) that preliminary report

  is mild to moderate encephalopathy.  There were no epileptiform discharges or 

  seizure on the EEG.  Pending official report.





* Patient is on Keppra 500mg 1 tab bid for seizure prophylaxis especially with 

  multiple brain lesion.  But the patient does not want to be on anti-epileptic 

  drugs.    Patient will try medication for 1 month and if he has any side-

  effects then avoid Keppra 


* Recommend SBP <140 because of brain metastasis and will defer management to 

  the primary team.


* Physical therapy and occupation therapy are consulted.


* Dr. Rik Villarreal (Radiation Oncologist is consulted).


* Upon discharge the patient needs to follow-up with his Oncologist.


* On Decadron 4 mg Q4 hour


* Patient does not want inpatient physical therapy and rather home therapy.


* Recommend the patient to follow-up with Neurologist and neurosurgeon as 

  outpatient upon discharge within 1-2 weeks.   The wife stated she is working 

  on him seeing a neurologist and neurosurgeon.





There is no further neurological work-up.


The plan is discussed with the patient's wife and his nurse.








Nolberto Martel MD


Neuro-Hospitalist





Time with Patient: Less than 30

## 2021-05-10 ENCOUNTER — HOSPITAL ENCOUNTER (INPATIENT)
Dept: HOSPITAL 47 - EC | Age: 71
LOS: 1 days | Discharge: HOME HEALTH | DRG: 637 | End: 2021-05-11
Attending: HOSPITALIST | Admitting: HOSPITALIST
Payer: OTHER GOVERNMENT

## 2021-05-10 DIAGNOSIS — Z20.822: ICD-10-CM

## 2021-05-10 DIAGNOSIS — T38.0X5A: ICD-10-CM

## 2021-05-10 DIAGNOSIS — G91.9: ICD-10-CM

## 2021-05-10 DIAGNOSIS — C76.0: ICD-10-CM

## 2021-05-10 DIAGNOSIS — R32: ICD-10-CM

## 2021-05-10 DIAGNOSIS — Z87.891: ICD-10-CM

## 2021-05-10 DIAGNOSIS — Z92.3: ICD-10-CM

## 2021-05-10 DIAGNOSIS — Z79.899: ICD-10-CM

## 2021-05-10 DIAGNOSIS — E89.0: ICD-10-CM

## 2021-05-10 DIAGNOSIS — R26.2: ICD-10-CM

## 2021-05-10 DIAGNOSIS — R29.6: ICD-10-CM

## 2021-05-10 DIAGNOSIS — F40.240: ICD-10-CM

## 2021-05-10 DIAGNOSIS — E87.1: ICD-10-CM

## 2021-05-10 DIAGNOSIS — Z82.49: ICD-10-CM

## 2021-05-10 DIAGNOSIS — Z79.4: ICD-10-CM

## 2021-05-10 DIAGNOSIS — G93.41: ICD-10-CM

## 2021-05-10 DIAGNOSIS — D69.6: ICD-10-CM

## 2021-05-10 DIAGNOSIS — I10: ICD-10-CM

## 2021-05-10 DIAGNOSIS — Z85.828: ICD-10-CM

## 2021-05-10 DIAGNOSIS — Z79.890: ICD-10-CM

## 2021-05-10 DIAGNOSIS — C79.31: ICD-10-CM

## 2021-05-10 DIAGNOSIS — E11.00: Primary | ICD-10-CM

## 2021-05-10 DIAGNOSIS — Z79.02: ICD-10-CM

## 2021-05-10 DIAGNOSIS — E86.0: ICD-10-CM

## 2021-05-10 DIAGNOSIS — I21.4: ICD-10-CM

## 2021-05-10 DIAGNOSIS — D72.810: ICD-10-CM

## 2021-05-10 DIAGNOSIS — Z85.21: ICD-10-CM

## 2021-05-10 DIAGNOSIS — R51.9: ICD-10-CM

## 2021-05-10 DIAGNOSIS — Z79.82: ICD-10-CM

## 2021-05-10 DIAGNOSIS — R41.3: ICD-10-CM

## 2021-05-10 DIAGNOSIS — R05: ICD-10-CM

## 2021-05-10 DIAGNOSIS — E07.9: ICD-10-CM

## 2021-05-10 DIAGNOSIS — Z85.118: ICD-10-CM

## 2021-05-10 LAB
ALBUMIN SERPL-MCNC: 3.2 G/DL (ref 3.5–5)
ALP SERPL-CCNC: 67 U/L (ref 38–126)
ALT SERPL-CCNC: 55 U/L (ref 4–49)
ANION GAP SERPL CALC-SCNC: 9 MMOL/L
APTT BLD: 20 SEC (ref 22–30)
AST SERPL-CCNC: 29 U/L (ref 17–59)
BASOPHILS # BLD AUTO: 0 K/UL (ref 0–0.2)
BASOPHILS NFR BLD AUTO: 0 %
BUN SERPL-SCNC: 29 MG/DL (ref 9–20)
CALCIUM SPEC-MCNC: 8.9 MG/DL (ref 8.4–10.2)
CHLORIDE SERPL-SCNC: 104 MMOL/L (ref 98–107)
CO2 SERPL-SCNC: 22 MMOL/L (ref 22–30)
EOSINOPHIL # BLD AUTO: 0 K/UL (ref 0–0.7)
EOSINOPHIL NFR BLD AUTO: 0 %
ERYTHROCYTE [DISTWIDTH] IN BLOOD BY AUTOMATED COUNT: 4.93 M/UL (ref 4.3–5.9)
ERYTHROCYTE [DISTWIDTH] IN BLOOD: 16.4 % (ref 11.5–15.5)
GLUCOSE BLD-MCNC: 231 MG/DL (ref 75–99)
GLUCOSE BLD-MCNC: 301 MG/DL (ref 75–99)
GLUCOSE BLD-MCNC: 401 MG/DL (ref 75–99)
GLUCOSE BLD-MCNC: 511 MG/DL (ref 75–99)
GLUCOSE SERPL-MCNC: 400 MG/DL (ref 74–99)
GLUCOSE UR QL: (no result)
HCT VFR BLD AUTO: 43.5 % (ref 39–53)
HGB BLD-MCNC: 14.3 GM/DL (ref 13–17.5)
INR PPP: 0.9 (ref ?–1.2)
LYMPHOCYTES # SPEC AUTO: 0.4 K/UL (ref 1–4.8)
LYMPHOCYTES NFR SPEC AUTO: 4 %
MCH RBC QN AUTO: 29 PG (ref 25–35)
MCHC RBC AUTO-ENTMCNC: 32.9 G/DL (ref 31–37)
MCV RBC AUTO: 88.2 FL (ref 80–100)
MONOCYTES # BLD AUTO: 0.2 K/UL (ref 0–1)
MONOCYTES NFR BLD AUTO: 2 %
NEUTROPHILS # BLD AUTO: 9.3 K/UL (ref 1.3–7.7)
NEUTROPHILS NFR BLD AUTO: 94 %
PH UR: 6 [PH] (ref 5–8)
PLATELET # BLD AUTO: 145 K/UL (ref 150–450)
POTASSIUM SERPL-SCNC: 4.8 MMOL/L (ref 3.5–5.1)
PROT SERPL-MCNC: 5.6 G/DL (ref 6.3–8.2)
PT BLD: 9.4 SEC (ref 9–12)
SODIUM SERPL-SCNC: 135 MMOL/L (ref 137–145)
SP GR UR: 1.03 (ref 1–1.03)
UROBILINOGEN UR QL STRIP: <2 MG/DL (ref ?–2)
WBC # BLD AUTO: 9.9 K/UL (ref 3.8–10.6)

## 2021-05-10 PROCEDURE — 93306 TTE W/DOPPLER COMPLETE: CPT

## 2021-05-10 PROCEDURE — 87635 SARS-COV-2 COVID-19 AMP PRB: CPT

## 2021-05-10 PROCEDURE — 80053 COMPREHEN METABOLIC PANEL: CPT

## 2021-05-10 PROCEDURE — 93005 ELECTROCARDIOGRAM TRACING: CPT

## 2021-05-10 PROCEDURE — 99285 EMERGENCY DEPT VISIT HI MDM: CPT

## 2021-05-10 PROCEDURE — 84484 ASSAY OF TROPONIN QUANT: CPT

## 2021-05-10 PROCEDURE — 36415 COLL VENOUS BLD VENIPUNCTURE: CPT

## 2021-05-10 PROCEDURE — 85610 PROTHROMBIN TIME: CPT

## 2021-05-10 PROCEDURE — 85025 COMPLETE CBC W/AUTO DIFF WBC: CPT

## 2021-05-10 PROCEDURE — 81003 URINALYSIS AUTO W/O SCOPE: CPT

## 2021-05-10 PROCEDURE — 80048 BASIC METABOLIC PNL TOTAL CA: CPT

## 2021-05-10 PROCEDURE — 70450 CT HEAD/BRAIN W/O DYE: CPT

## 2021-05-10 PROCEDURE — 85730 THROMBOPLASTIN TIME PARTIAL: CPT

## 2021-05-10 PROCEDURE — 71046 X-RAY EXAM CHEST 2 VIEWS: CPT

## 2021-05-10 PROCEDURE — 83036 HEMOGLOBIN GLYCOSYLATED A1C: CPT

## 2021-05-10 RX ADMIN — INSULIN ASPART SCH UNIT: 100 INJECTION, SOLUTION INTRAVENOUS; SUBCUTANEOUS at 16:41

## 2021-05-10 RX ADMIN — PANTOPRAZOLE SODIUM SCH MG: 40 TABLET, DELAYED RELEASE ORAL at 21:24

## 2021-05-10 RX ADMIN — NYSTATIN SCH UNIT: 100000 SUSPENSION ORAL at 21:25

## 2021-05-10 RX ADMIN — INSULIN ASPART SCH UNIT: 100 INJECTION, SOLUTION INTRAVENOUS; SUBCUTANEOUS at 21:25

## 2021-05-10 NOTE — ED
General Adult HPI





- General


Chief complaint: Altered Mental Status


Stated complaint: confusion, weakness


Time Seen by Provider: 05/10/21 10:29


Source: patient, RN notes reviewed


Mode of arrival: ambulatory


Limitations: no limitations





- History of Present Illness


Initial comments: 





Patient is a pleasant 70-year-old male presenting to the emergency Department 

with weakness.  Progressed over the past few days.  Patient does have metastatic

brain cancer with history of primary lung and laryngeal cancer.  Patient 

finished radiation therapy several weeks ago.  Patient did have some right leg 

weakness and difficulty walking.  Patient has had some urinary incontinence.  

Symptoms have been waxing and waning.  Patient does have some mild headaches.  

Patient is having memory problems which is somewhat chronic.  Patient does have 

history of several brain lesions, most are improving however one is worsening 

and did have some bleeding previously.  Family provides majority of history.





- Related Data


                                Home Medications











 Medication  Instructions  Recorded  Confirmed


 


Levothyroxine Sodium 150 mcg PO DAILY 04/28/21 05/10/21


 


Atenolol [Tenormin] 50 mg PO DAILY 05/10/21 05/10/21


 


Dexamethasone [Decadron] 8 mg PO DAILY 05/10/21 05/10/21


 


Nystatin 100,000 Unit/ml Susp 500,000 unit PO QID 05/10/21 05/10/21





[Mycostatin Oral Susp]   








                                  Previous Rx's











 Medication  Instructions  Recorded


 


Acetaminophen Tab [Tylenol] 650 mg PO Q6HR PRN  tab 21


 


Pantoprazole [Protonix] 40 mg PO AC-BID 14 Days #28 21





 tablet. 











                                    Allergies











Allergy/AdvReac Type Severity Reaction Status Date / Time


 


No Known Allergies Allergy   Verified 05/10/21 11:59














Review of Systems


ROS Statement: 


Those systems with pertinent positive or pertinent negative responses have been 

documented in the HPI.





ROS Other: All systems not noted in ROS Statement are negative.


Constitutional: Denies: fever


Eyes: Denies: eye pain


ENT: Denies: ear pain


Respiratory: Denies: cough


Cardiovascular: Denies: chest pain


Endocrine: Denies: fatigue


Gastrointestinal: Denies: abdominal pain


Genitourinary: Denies: dysuria


Musculoskeletal: Denies: back pain


Neurological: Reports: as per HPI, headache, weakness, confusion





Past Medical History


Past Medical History: Cancer, Hypertension, Thyroid Disorder


Additional Past Medical History / Comment(s): CURRENT: PET SCAN SHOWS POSSIBLE 

METASTATIC LESION IN RIGHT CENTRAL LUNG; PATIENT HAS HAD A COUGH FOR SEVERAL 

WEEKS.   , GLOTTIS CANCER (SQUAMOUS CELL).  MULTIPLE SKIN CANCERS RIGHT 

CHEST, LEFT ELBOW,   RIGHT ELBOW.


History of Any Multi-Drug Resistant Organisms: None Reported


Additional Past Surgical History / Comment(s): 2020 LARYNGECTOMY 

(TRACH), PARTIAL RESECTION OF NASAL AND THROAT ARE & THYROIDECTOMY @ [facility].  PROSTHETIC USED TO HELP HIM TO TALK.  ABLE TO EAT ORALLY.


Past Anesthesia/Blood Transfusion Reactions: No Reported Reaction


Past Psychological History: No Psychological Hx Reported


Smoking Status: Former smoker


Past Alcohol Use History: Daily


Past Drug Use History: None Reported





- Past Family History


  ** Father


Family Medical History: Myocardial Infarction (MI)


Additional Family Medical History / Comment(s): Father  of a MI at the age 

of 58yrs.





  ** Mother


Family Medical History: CVA/TIA


Additional Family Medical History / Comment(s): Mother is alive and is 96yrs 

old.





General Exam


Limitations: no limitations


General appearance: alert, in no apparent distress


Head exam: Present: atraumatic


Eye exam: Present: normal appearance, PERRL, EOMI


ENT exam: Present: other (Tracheostomy)


Neck exam: Present: normal inspection


Respiratory exam: Present: normal lung sounds bilaterally


Cardiovascular Exam: Present: regular rate, normal rhythm


GI/Abdominal exam: Present: soft.  Absent: tenderness


Extremities exam: Present: normal inspection


Neurological exam: Present: alert


  ** Expanded


Neurological exam: Present: protecting the airway


Cranial nerves: EOM's Intact: Normal, Facial Sensation: Normal


Sensory exam: Upper Extremity Light Touch: Normal, Lower Extremity Light Touch: 

Normal


Motor strength exam: RUE: 5, LUE: 5, RLE: 4 (Family states chronic), LLE: 5


Psychiatric exam: Present: normal affect, normal mood


Skin exam: Present: normal color





Course


                                   Vital Signs











  05/10/21





  10:25


 


Temperature 97.8 F


 


Pulse Rate 86


 


Respiratory 18





Rate 


 


Blood Pressure 134/87


 


O2 Sat by Pulse 94 L





Oximetry 














EKG Findings





- EKG Comments:


EKG Findings:: Normal sinus rhythm with a rate of 66.  .  .  QT 

4:30.  QTC 49.  Normal axis.  Normal QRS.  T wave inversion V1 through V6 as 

well as aVL.





Medical Decision Making





- Medical Decision Making





Patient reevaluated and resting comfortably in bed, unchanged.  Patient and 

family updated on results and plan.  Case was discussed twice with Dr. Villarreal 

who would like patient to be held overnight for glucose control.  He will 

consult.  Case was also discussed with Dr. Macdonald, who will admit covering for 

this [facility].





- Lab Data


Result diagrams: 


                                 05/10/21 11:05





                                 05/10/21 11:05


                                   Lab Results











  05/10/21 05/10/21 05/10/21 Range/Units





  11:05 11:05 11:05 


 


WBC  9.9    (3.8-10.6)  k/uL


 


RBC  4.93    (4.30-5.90)  m/uL


 


Hgb  14.3    (13.0-17.5)  gm/dL


 


Hct  43.5    (39.0-53.0)  %


 


MCV  88.2    (80.0-100.0)  fL


 


MCH  29.0    (25.0-35.0)  pg


 


MCHC  32.9    (31.0-37.0)  g/dL


 


RDW  16.4 H    (11.5-15.5)  %


 


Plt Count  145 L    (150-450)  k/uL


 


MPV  7.6    


 


Neutrophils %  94    %


 


Lymphocytes %  4    %


 


Monocytes %  2    %


 


Eosinophils %  0    %


 


Basophils %  0    %


 


Neutrophils #  9.3 H    (1.3-7.7)  k/uL


 


Lymphocytes #  0.4 L    (1.0-4.8)  k/uL


 


Monocytes #  0.2    (0-1.0)  k/uL


 


Eosinophils #  0.0    (0-0.7)  k/uL


 


Basophils #  0.0    (0-0.2)  k/uL


 


Anisocytosis  Slight    


 


PT   9.4   (9.0-12.0)  sec


 


INR   0.9   (<1.2)  


 


APTT   20.0 L   (22.0-30.0)  sec


 


Sodium    135 L  (137-145)  mmol/L


 


Potassium    4.8  (3.5-5.1)  mmol/L


 


Chloride    104  ()  mmol/L


 


Carbon Dioxide    22  (22-30)  mmol/L


 


Anion Gap    9  mmol/L


 


BUN    29 H  (9-20)  mg/dL


 


Creatinine    0.78  (0.66-1.25)  mg/dL


 


Est GFR (CKD-EPI)AfAm    >90  (>60 ml/min/1.73 sqM)  


 


Est GFR (CKD-EPI)NonAf    >90  (>60 ml/min/1.73 sqM)  


 


Glucose    400 H  (74-99)  mg/dL


 


Calcium    8.9  (8.4-10.2)  mg/dL


 


Total Bilirubin    0.8  (0.2-1.3)  mg/dL


 


AST    29  (17-59)  U/L


 


ALT    55 H  (4-49)  U/L


 


Alkaline Phosphatase    67  ()  U/L


 


Troponin I     (0.000-0.034)  ng/mL


 


Total Protein    5.6 L  (6.3-8.2)  g/dL


 


Albumin    3.2 L  (3.5-5.0)  g/dL














  05/10/21 Range/Units





  11:05 


 


WBC   (3.8-10.6)  k/uL


 


RBC   (4.30-5.90)  m/uL


 


Hgb   (13.0-17.5)  gm/dL


 


Hct   (39.0-53.0)  %


 


MCV   (80.0-100.0)  fL


 


MCH   (25.0-35.0)  pg


 


MCHC   (31.0-37.0)  g/dL


 


RDW   (11.5-15.5)  %


 


Plt Count   (150-450)  k/uL


 


MPV   


 


Neutrophils %   %


 


Lymphocytes %   %


 


Monocytes %   %


 


Eosinophils %   %


 


Basophils %   %


 


Neutrophils #   (1.3-7.7)  k/uL


 


Lymphocytes #   (1.0-4.8)  k/uL


 


Monocytes #   (0-1.0)  k/uL


 


Eosinophils #   (0-0.7)  k/uL


 


Basophils #   (0-0.2)  k/uL


 


Anisocytosis   


 


PT   (9.0-12.0)  sec


 


INR   (<1.2)  


 


APTT   (22.0-30.0)  sec


 


Sodium   (137-145)  mmol/L


 


Potassium   (3.5-5.1)  mmol/L


 


Chloride   ()  mmol/L


 


Carbon Dioxide   (22-30)  mmol/L


 


Anion Gap   mmol/L


 


BUN   (9-20)  mg/dL


 


Creatinine   (0.66-1.25)  mg/dL


 


Est GFR (CKD-EPI)AfAm   (>60 ml/min/1.73 sqM)  


 


Est GFR (CKD-EPI)NonAf   (>60 ml/min/1.73 sqM)  


 


Glucose   (74-99)  mg/dL


 


Calcium   (8.4-10.2)  mg/dL


 


Total Bilirubin   (0.2-1.3)  mg/dL


 


AST   (17-59)  U/L


 


ALT   (4-49)  U/L


 


Alkaline Phosphatase   ()  U/L


 


Troponin I  0.064 H*  (0.000-0.034)  ng/mL


 


Total Protein   (6.3-8.2)  g/dL


 


Albumin   (3.5-5.0)  g/dL














- Radiology Data


Radiology results: report reviewed (Computed tomography scan of the brain does 

show multiple rounded foci of hyperdensity that is less hyperdense.  No new 

lesions or hemorrhage appreciated.  Persistent moderate hydronephrosis.), image 

reviewed (Chest x-ray shows no acute process)





Disposition


Clinical Impression: 


 Hyperglycemia, Weakness





Disposition: ADMITTED AS IP TO THIS HOSP


Is patient prescribed a controlled substance at d/c from ED?: No


Referrals: 


[facility],Clinic [Primary Care Provider] - 1-2 days


Decision Time: 12:31

## 2021-05-10 NOTE — HP
HISTORY AND PHYSICAL



DATE OF SERVICE:

05/10/2021



CHIEF COMPLAINTS:

Weakness and fall and change in mental status.



HISTORY OF PRESENT ILLNESS:

This 70-year-old gentleman with a past medical history of multiple medical 
problems,

including hypertension, history of small-cell lung cancer with metastasis to the
brain,

[facility], is also seeing Dr. Winter and 
Dr. Villarreal. The patient was recently admitted with falls and weakness. Currently 
the

patient has also finished radiation therapy and the patient also had MRI workup

recently, but the family has noticed that the patient is becoming progressively 
weak

and falling and also has some change in mental status.  The patient also had a

tracheostomy. After admission the blood sugar was found to be elevated up to 
511, and

the patient is admitted for further evaluation and treatment.  There is no 
history of

any fever, rigor or chills.  No history of any headache, loss of consciousness,

seizures at this time.  The most recent CT scan of the brain, which was reviewed

personally by me, showed evidence of multiple rounded foci of hyperdensity 
suggestive

of improving of the focal hemorrhage and improving of the herniating metastasis.
 There

is no history of any trauma at this time.



PAST MEDICAL HISTORY:

History of lung cancer with metastasis to brain, hypertension, hypothyroidism.



HOME MEDICATIONS:

Reviewed. They include atenolol, Protonix, nystatin, levothyroxine, Decadron and

Tylenol.



ALLERGIES:

NONE.



FAMILY HISTORY:

History of myocardial infarction in the family.



SOCIAL HISTORY:

 smoking.  No history of alcohol.



REVIEW OF SYSTEMS:

Review of systems could not be taken at this time because of change in mental 
status.



PHYSICAL EXAMINATION:

Patient is conscious.  The pulse is 59, blood pressure 127/92, respirations 16,

temperature normal, pulse ox 94% on room air.

HEENT:  Conjunctivae normal.

NECK: No jugular venous distention. Tracheostomy.

CARDIOVASCULAR SYSTEM:  S1, S2 muffled.

RESPIRATORY SYSTEM: Breath sounds diminished at the bases.  A few scattered 
rhonchi.

ABDOMEN: Soft, non-tender.

LEGS: No edema. No swelling.

NERVOUS SYSTEM: Higher functions as mentioned earlier. Moves all 4 limbs. 
Otherwise,

diffusely weak.

LYMPHATICS: No lymph node palpable in neck, axillae or groin.  NAUSEA

SKIN: No ulcer, rash, bleeding.

JOINTS: No active deforming arthropathy.



LABS:

Labs at this time show WBC 9.2, hemoglobin 14.3, platelets 145.  Lymphocytes are
0.4.

Sodium is 135, glucose 400, 511, 401, 301. Troponin 0.064.



ASSESSMENT:

1. Change in mental status, acute metabolic encephalopathy because of 
uncontrolled

    diabetes mellitus, type 2.

2. Diabetes mellitus, type 2, with hyperosmolar state, possibly induced by 
steroids.

3. Small-cell lung cancer with multiple metastases in the brain, status post

    radiation.

4. Hyponatremia.

5. Thrombocytopenia.

6. Lymphopenia.

7. Troponin 0.064, indeterminate.

8. History of hypertension.

9. Hypothyroidism.

10.Gait dysfunction.

11.History of laryngectomy, thyroidectomy, and throat and nasal reconstruction.

12.History of nicotine dependence.

13.FULL CODE.



RECOMMENDATIONS AND DISCUSSION:

In this 70-year-old gentleman who presented with multiple complex medical 
issues, we

will monitor the patient closely.  The blood sugars are improving with insulin. 
I

would recommend 20 units of Lantus and continue to monitor.  Other than that, I 
would

recommend repeat labs. PT/OT evaluation.  Will follow with multiple consultants.
There

is no evidence of any infection.  The chest x-ray, which was reviewed personally
by me,

showed no evidence of any acute abnormality.  Prognosis is guarded.  Home 
medications

will be continued. Further recommendations to follow. A copy of this dictation 
is being

forwarded to Dr. Randall, who is the primary physician.  Will consult

Hematology/Oncology as well.





MMODL / JAYLINN: [id number] / Job#: [id number]

MTDD

## 2021-05-10 NOTE — CT
EXAMINATION TYPE: CT brain wo con

 

DATE OF EXAM: 5/10/2021

 

COMPARISON: 4/28/2021

 

HISTORY: Weakness, confusion

 

CT DLP: 1147.4 mGycm

 

Unenhanced CT of the brain was performed.  

 

The ventricles, basal cisterns and sulci overlying the cerebral convexities demonstrate moderate enla
rgement. 

 

Again noted are multiple foci of increased density within the brain stem measuring 8 mm, left tempora
l lobe 1.2 x 7 cm, the largest within the left frontal region measuring approximately 2 cm as well as
 smaller lesions within the high left parietal and frontal regions. The degree of hyperdensity has di
minished in the interval suggesting improved areas of hemorrhage and/or improving hemorrhagic metasta
ses.

 

There is decreased attenuation about the periventricular white matter and deep white matter of both c
erebral hemispheres, compatible with chronic small vessel ischemia. Differential diagnosis does inclu
de demyelination. 

 

No midline shift.

 

Several calvarial lesions are noted.

 

If symptoms persist consider MRI.  

 

IMPRESSION:

 

1. Again noted are multiple rounded foci of hyperdensity which appear to be less hyperdense than on p
rior study suggesting improving areas of focal hemorrhage and/or improving hemorrhagic metastases. No
 new lesions or new areas of hemorrhage appreciated. No midline shift.

2. Persistent moderate hydrocephalus.

## 2021-05-11 VITALS
DIASTOLIC BLOOD PRESSURE: 78 MMHG | TEMPERATURE: 97.2 F | RESPIRATION RATE: 18 BRPM | SYSTOLIC BLOOD PRESSURE: 137 MMHG | HEART RATE: 57 BPM

## 2021-05-11 LAB
ANION GAP SERPL CALC-SCNC: 3 MMOL/L
BASOPHILS # BLD AUTO: 0 K/UL (ref 0–0.2)
BASOPHILS NFR BLD AUTO: 0 %
BUN SERPL-SCNC: 29 MG/DL (ref 9–20)
CALCIUM SPEC-MCNC: 8.8 MG/DL (ref 8.4–10.2)
CHLORIDE SERPL-SCNC: 105 MMOL/L (ref 98–107)
CO2 SERPL-SCNC: 29 MMOL/L (ref 22–30)
EOSINOPHIL # BLD AUTO: 0 K/UL (ref 0–0.7)
EOSINOPHIL NFR BLD AUTO: 0 %
ERYTHROCYTE [DISTWIDTH] IN BLOOD BY AUTOMATED COUNT: 4.62 M/UL (ref 4.3–5.9)
ERYTHROCYTE [DISTWIDTH] IN BLOOD: 16.7 % (ref 11.5–15.5)
GLUCOSE BLD-MCNC: 122 MG/DL (ref 75–99)
GLUCOSE BLD-MCNC: 137 MG/DL (ref 75–99)
GLUCOSE BLD-MCNC: 65 MG/DL (ref 75–99)
GLUCOSE SERPL-MCNC: 73 MG/DL (ref 74–99)
HBA1C MFR BLD: 8.3 % (ref 4–6)
HCT VFR BLD AUTO: 40.8 % (ref 39–53)
HGB BLD-MCNC: 13.6 GM/DL (ref 13–17.5)
LYMPHOCYTES # SPEC AUTO: 0.5 K/UL (ref 1–4.8)
LYMPHOCYTES NFR SPEC AUTO: 7 %
MCH RBC QN AUTO: 29.4 PG (ref 25–35)
MCHC RBC AUTO-ENTMCNC: 33.3 G/DL (ref 31–37)
MCV RBC AUTO: 88.1 FL (ref 80–100)
MONOCYTES # BLD AUTO: 0.1 K/UL (ref 0–1)
MONOCYTES NFR BLD AUTO: 2 %
NEUTROPHILS # BLD AUTO: 6.1 K/UL (ref 1.3–7.7)
NEUTROPHILS NFR BLD AUTO: 90 %
PLATELET # BLD AUTO: 126 K/UL (ref 150–450)
POTASSIUM SERPL-SCNC: 3.9 MMOL/L (ref 3.5–5.1)
SODIUM SERPL-SCNC: 137 MMOL/L (ref 137–145)
WBC # BLD AUTO: 6.8 K/UL (ref 3.8–10.6)

## 2021-05-11 RX ADMIN — NYSTATIN SCH UNIT: 100000 SUSPENSION ORAL at 08:19

## 2021-05-11 RX ADMIN — PANTOPRAZOLE SODIUM SCH MG: 40 TABLET, DELAYED RELEASE ORAL at 07:05

## 2021-05-11 RX ADMIN — NYSTATIN SCH UNIT: 100000 SUSPENSION ORAL at 00:15

## 2021-05-11 RX ADMIN — INSULIN ASPART SCH: 100 INJECTION, SOLUTION INTRAVENOUS; SUBCUTANEOUS at 13:15

## 2021-05-11 RX ADMIN — INSULIN ASPART SCH UNIT: 100 INJECTION, SOLUTION INTRAVENOUS; SUBCUTANEOUS at 07:05

## 2021-05-11 NOTE — DS
DISCHARGE SUMMARY



FINAL DIAGNOSES:

1. Change in mental status, acute metabolic encephalopathy because of uncontrolled

    diabetes mellitus, type 2.

2. Diabetes mellitus, type 2, new onset, with hyperosmolar state, possibly induced by

    steroids, with no evidence of ketosis.

3. Small-cell lung cancer with multiple metastases to the brain, status post

    radiation.

4. Hyponatremia.

5. Thrombocytopenia.

6. Lymphopenia.

7. Change in mental status, acute metabolic encephalopathy.

8. Troponin 0.064, indeterminate.

9. History of hypertension.

10.Hypothyroidism.

11.Gait dysfunction.

12.History of laryngectomy, thyroidectomy and throat and nasal reconstruction.

13.History of nicotine dependence.

14.FULL CODE.



DISCHARGE DISPOSITION:

The patient will be discharged in stable condition with guarded prognosis. Total time

taken 35 minutes.



HISTORY OF PRESENT ILLNESS:

This 70-year-old gentleman with a past medical history of multiple medical problems was

admitted with change in mental status, weakness, falls and multiple other medical

issues.  Blood sugar was found to be elevated up to 511.  There was no evidence of

ketosis. Patient responded to subcutaneous insulin protocol. Patient improved

significantly and the patient's wife and the family were keen on the patient going home

at this time, so the patient will be discharged in stable condition with guarded

prognosis.  I would recommend for the patient a daily dose of Lantus and monitor Accu-

Cheks very closely and follow with primary physician and avoid any Lantus injections

with blood sugar less than 120.



On exam, vitals are stable.

CARDIOVASCULAR SYSTEM:  S1, S2 muffled.

ABDOMEN: Soft.

NERVOUS SYSTEM: No focal deficit.



DISCHARGE ADVICE AND MEDICATIONS:

1. Diet is cardiac, consistent carb.

2. Activity limited until followup.

3. Follow up with [facility] in 2-3 days with labs CBC, BMP.

4. Follow up with Oncology and Radiation Oncology, Dr. Villarreal, as recommended.

5. Decadron 8 mg p.o. daily.

6. Levothyroxine 150 mcg p.o. daily.

7. Nystatin 500,000 p.o. daily as before.

8. Tenormin 50 mg p.o. daily.

9. Aspirin 81 mg daily.

10.Folic acid 1 mg daily.

11.Levemir 15 units subcutaneously at bedtime. Hold if the Accu-Chek is less than 120.

12.Lipitor 80 mg daily.

13.Multivitamins 1 p.o. daily.

14.Plavix 75 mg daily.

15.Protonix 40 mg daily.

16.Tylenol p.r.n.

17.Vitamin B1 100 mg p.o. daily.





MMODL / IJN: [id number] / Job#: [id number]

## 2021-05-11 NOTE — P.CONS
History of Present Illness





- Reason for Consult


Consult date: 21


Brain metastatic disease





- Chief Complaint


Weakness, urinary incontinence, and headache





- History of Present Illness


70-year-old male with a history of  locally advanced squamous cell carcinoma of 

the larynx, he is status post total laryngectomy and bilateral lymph node 

dissection ,  stage III (pT3, pN0, M0) squamous cell carcinoma the larynx with 

subglottic extension and lymphovascular space invasion.  He underwent a course 

of adjuvant radiation finishing on May 21, 2020, also he has a history  of 

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  He has undergone 

chemoimmunotherapy with excellent response, he developed brain metastases this 

year , the patient recieved WBRT which he completed on 2021.  


follow-up CAT scan of the brain after 2 weeks of completing WBRT has revealed 

decreasing in the size of the enhancing masses except for the lesion in the left

caudate nucleus which measures 2.7 cm now this could be a hemorrhagic. 


the patient came to the emergency room for weakness ,difficulty walking , 

urinary incontinence, repeating  CAT scan of the head showed no changes 

comparing to the most recent  CAT scan in 2021.


It was found that he is hyperglycemic which is probably related to Decadron 

medication , the patient was placed in the hospital and admitted for supportive 

care. 


He is unable to communicate verbally, but he pointed to his head for headache, 

and weakness in the legs. 








Review of Systems


Constitutional: Reports as per HPI


Ears, nose, mouth and throat: Reports as per HPI


Cardiovascular: Reports as per HPI


Respiratory: Reports as per HPI


Gastrointestinal: Reports as per HPI


Genitourinary: Reports as per HPI


Musculoskeletal: Reports as per HPI


Integumentary: Reports as per HPI


Neurological: Reports as per HPI


Psychiatric: Reports as per HPI


Endocrine: Reports as per HPI


Hematologic/Lymphatic: Reports as per HPI


Allergic/Immunologic: Reports as per HPI





Past Medical History


Past Medical History: Cancer, Hypertension, Thyroid Disorder


Additional Past Medical History / Comment(s): Pt recently admitted to [facility] with 

recurrent falls/R leg weakness 2ndary to brain stem lesions/multiple brain 

lesions, hyponatremia.     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 

cancer with laryngectomy/thyroidectomy/neck/throat and nasal reconstruction, 

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 

radiation treatments/now has falls/increased R leg weakness, past skin cancer 

with removals


History of Any Multi-Drug Resistant Organisms: None Reported


Additional Past Surgical History / Comment(s): [facility]: total 

laryngectomy/thyroidectomy, throat/neck/nasal resection with 

tracheostomy/prosthetic speaking valve, 20 brochoscopy with 

BAL/brushings/biopsies, colonoscopy.


Past Anesthesia/Blood Transfusion Reactions: No Reported Reaction


Past Psychological History: No Psychological Hx Reported


Additional Psychological History / Comment(s): CLAUSTROPHOBIA.  Pt resides with 

his spouse.  He has a borrowed walker which is too wide for him and a cane but 

does not use them.  He no longer drives, his spouse takes him to app. Pt is a 

 and receiving home care, PT/OT and nurse thru [facility].  Pt has a 

cane and a walker.


Smoking Status: Former smoker


Past Alcohol Use History: Daily


Additional Past Alcohol Use History / Comment(s): Pt started smoking in  and

quit in .  He was a heavy drinker-6 beers/day but recently quit all 

together.


Past Drug Use History: None Reported





- Past Family History


  ** Father


Family Medical History: Myocardial Infarction (MI)


Additional Family Medical History / Comment(s): Father  of a MI at the age 

of 58yrs.





  ** Mother


Family Medical History: CVA/TIA


Additional Family Medical History / Comment(s): Mother is alive and is 96yrs 

old.





Medications and Allergies


                                Home Medications











 Medication  Instructions  Recorded  Confirmed  Type


 


Levothyroxine Sodium 150 mcg PO DAILY 04/28/21 05/10/21 History


 


Acetaminophen Tab [Tylenol] 650 mg PO Q6HR PRN  tab 04/30/21 05/10/21 Rx


 


Pantoprazole [Protonix] 40 mg PO AC-BID 14 Days #28 04/30/21 05/10/21 Rx





 tablet.   


 


Atenolol [Tenormin] 50 mg PO DAILY 05/10/21 05/10/21 History


 


Dexamethasone [Decadron] 8 mg PO DAILY 05/10/21 05/10/21 History


 


Nystatin 100,000 Unit/ml Susp 500,000 unit PO QID 05/10/21 05/10/21 History





[Mycostatin Oral Susp]    








                                    Allergies











Allergy/AdvReac Type Severity Reaction Status Date / Time


 


No Known Allergies Allergy   Verified 05/10/21 11:59














Physical Exam


Vitals: 


                                   Vital Signs











  Temp Pulse Pulse Resp BP BP Pulse Ox


 


 21 08:15  98.3 F   55 L  20   130/77  91 L


 


 21 04:35  98.2 F   58 L  20   135/79  94 L


 


 21 00:05  98.2 F   62  20   118/63  95


 


 05/10/21 20:29  97.8 F   59 L  22   143/79  95


 


 05/10/21 19:34  98.2 F  60   18  134/84   94 L


 


 05/10/21 14:00   59 L   16  127/92   95


 


 05/10/21 13:30   58 L   16  131/81   96


 


 05/10/21 13:00   58 L   18  143/80   95


 


 05/10/21 12:30   61   16  124/77   94 L


 


 05/10/21 12:00   60   18  139/81   93 L


 


 05/10/21 10:25  97.8 F  86   18  134/87   94 L








                                Intake and Output











 05/10/21 05/11/21 05/11/21





 22:59 06:59 14:59


 


Output Total 125 225 


 


Balance -125 -225 


 


Output:   


 


  Urine 125 225 


 


Other:   


 


  Voiding Method Urinal Urinal 





 Diaper Diaper 


 


  # Voids 1  


 


  Weight 77.564 kg 71.5 kg 














- EENT


ENT: other (Tracheostomy, surgical scars, mild swelling in the neck.)





- Neck


Neck: other





- Respiratory


Respiratory: bilateral: rales





- Cardiovascular


Rhythm: regular





- Gastrointestinal


General gastrointestinal: no organomegaly, soft, no tenderness





- Integumentary


Integumentary: normal, normal turgor





- Neurologic


Neurologic: CNII-XII intact





- Musculoskeletal


Musculoskeletal: generalized weakness





- Psychiatric


Psychiatric: A&O x's 3, appropriate affect, intact judgment & insight





Results


CBC & Chem 7: 


                                 21 07:44





                                 21 07:44


Labs: 


                  Abnormal Lab Results - Last 24 Hours (Table)











  05/10/21 05/10/21 05/10/21 Range/Units





  11:05 11:05 11:05 


 


RDW  16.4 H    (11.5-15.5)  %


 


Plt Count  145 L    (150-450)  k/uL


 


Neutrophils #  9.3 H    (1.3-7.7)  k/uL


 


Lymphocytes #  0.4 L    (1.0-4.8)  k/uL


 


APTT   20.0 L   (22.0-30.0)  sec


 


Sodium     (137-145)  mmol/L


 


BUN     (9-20)  mg/dL


 


Glucose     (74-99)  mg/dL


 


POC Glucose (mg/dL)     (75-99)  mg/dL


 


Hemoglobin A1c     (4.0-6.0)  %


 


ALT     (4-49)  U/L


 


Troponin I     (0.000-0.034)  ng/mL


 


Total Protein     (6.3-8.2)  g/dL


 


Albumin     (3.5-5.0)  g/dL


 


Urine Protein    Trace H  (Negative)  


 


Urine Glucose (UA)    4+ H  (Negative)  














  05/10/21 05/10/21 05/10/21 Range/Units





  11:05 11:05 13:07 


 


RDW     (11.5-15.5)  %


 


Plt Count     (150-450)  k/uL


 


Neutrophils #     (1.3-7.7)  k/uL


 


Lymphocytes #     (1.0-4.8)  k/uL


 


APTT     (22.0-30.0)  sec


 


Sodium  135 L    (137-145)  mmol/L


 


BUN  29 H    (9-20)  mg/dL


 


Glucose  400 H    (74-99)  mg/dL


 


POC Glucose (mg/dL)    511 H  (75-99)  mg/dL


 


Hemoglobin A1c     (4.0-6.0)  %


 


ALT  55 H    (4-49)  U/L


 


Troponin I   0.064 H*   (0.000-0.034)  ng/mL


 


Total Protein  5.6 L    (6.3-8.2)  g/dL


 


Albumin  3.2 L    (3.5-5.0)  g/dL


 


Urine Protein     (Negative)  


 


Urine Glucose (UA)     (Negative)  














  05/10/21 05/10/21 05/10/21 Range/Units





  16:13 17:42 18:27 


 


RDW     (11.5-15.5)  %


 


Plt Count     (150-450)  k/uL


 


Neutrophils #     (1.3-7.7)  k/uL


 


Lymphocytes #     (1.0-4.8)  k/uL


 


APTT     (22.0-30.0)  sec


 


Sodium     (137-145)  mmol/L


 


BUN     (9-20)  mg/dL


 


Glucose     (74-99)  mg/dL


 


POC Glucose (mg/dL)  401 H  301 H   (75-99)  mg/dL


 


Hemoglobin A1c    8.3 H  (4.0-6.0)  %


 


ALT     (4-49)  U/L


 


Troponin I     (0.000-0.034)  ng/mL


 


Total Protein     (6.3-8.2)  g/dL


 


Albumin     (3.5-5.0)  g/dL


 


Urine Protein     (Negative)  


 


Urine Glucose (UA)     (Negative)  














  05/10/21 05/10/21 05/10/21 Range/Units





  18:27 20:57 22:12 


 


RDW     (11.5-15.5)  %


 


Plt Count     (150-450)  k/uL


 


Neutrophils #     (1.3-7.7)  k/uL


 


Lymphocytes #     (1.0-4.8)  k/uL


 


APTT     (22.0-30.0)  sec


 


Sodium     (137-145)  mmol/L


 


BUN     (9-20)  mg/dL


 


Glucose     (74-99)  mg/dL


 


POC Glucose (mg/dL)   231 H   (75-99)  mg/dL


 


Hemoglobin A1c     (4.0-6.0)  %


 


ALT     (4-49)  U/L


 


Troponin I  0.050 H*   0.045 H*  (0.000-0.034)  ng/mL


 


Total Protein     (6.3-8.2)  g/dL


 


Albumin     (3.5-5.0)  g/dL


 


Urine Protein     (Negative)  


 


Urine Glucose (UA)     (Negative)  














  21 Range/Units





  06:16 07:44 07:44 


 


RDW   16.7 H   (11.5-15.5)  %


 


Plt Count   126 L   (150-450)  k/uL


 


Neutrophils #     (1.3-7.7)  k/uL


 


Lymphocytes #   0.5 L   (1.0-4.8)  k/uL


 


APTT     (22.0-30.0)  sec


 


Sodium     (137-145)  mmol/L


 


BUN    29 H  (9-20)  mg/dL


 


Glucose    73 L  (74-99)  mg/dL


 


POC Glucose (mg/dL)  137 H    (75-99)  mg/dL


 


Hemoglobin A1c     (4.0-6.0)  %


 


ALT     (4-49)  U/L


 


Troponin I     (0.000-0.034)  ng/mL


 


Total Protein     (6.3-8.2)  g/dL


 


Albumin     (3.5-5.0)  g/dL


 


Urine Protein     (Negative)  


 


Urine Glucose (UA)     (Negative)  














Assessment and Plan


Assessment: 


68 years old with a history of locally advanced squamous cell carcinoma of the 

larynx , extensive small cell lung cancer with metastases  to the brain status 

post systemic chemotherapy and radiation , status post whole brain radiation 

which he completed in 2021. 





(1) Brain cancer


Current Visit: No   Status: Acute   Code(s): C71.9 - MALIGNANT NEOPLASM OF 

BRAIN, UNSPECIFIED   SNOMED Code(s): 166184560


   





(2) Head and neck cancer


Current Visit: No   Status: Acute   Code(s): C76.0 - MALIGNANT NEOPLASM OF HEAD,

FACE AND NECK   SNOMED Code(s): 964259932


   





(3) Mass of upper lobe of right lung


Current Visit: No   Status: Acute   Code(s): R91.8 - OTHER NONSPECIFIC ABNORMAL 

FINDING OF LUNG FIELD   SNOMED Code(s): 110686490


   


Plan: 


Images reviewed, no new lesions or hemorrhage was seen in the CT scan of the 

head, the patient is symptomatic probably to the mass effect of  the brain lesio

ns or related to the vasogenic edema that was caused probably by  by the recent 

radiation effect , he is taking  decadron to control these effects , and 

fortunately the decadron has  increased his blood sugar to be  hyperglycemic .  


No further Radiotherapy intervention would be recommended at this time.


Continue with the supportive care and management as indicated.

## 2021-05-11 NOTE — P.CRDCN
History of Present Illness


History of present illness: 


HISTORY OF PRESENTING ILLNESS


This is a pleasant 70-year-old  male past medical history significant 

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 

laryngectomy and bilateral lymph node dissection, tracheostomy, hypertension, 

former nicotine dependence, former alcohol dependence..  He does not follow with

a cardiologist. We have been asked to see in consultation for elevated troponin 

and chest pain.  Patient presents to the emergency department with worsening 

weakness for the past 2-3 days.  He also has been having some mild chest 

discomfort and shortness of breath for the past 2-3 days.  Patient denies 

nausea, diaphoresis.  Patient has been previously admitted / with 

frequent falls and weakness at that time was thought that this was due to 

brainstem lesion multiple stenotic lesions in the brain.  Patient was started on

Decadron and Keppra per neurology and oncology was consulted for management of 

patient. Patient was stabilized and discharged home.   Patient is seen and 

examined at bedside, no acute distress. Denies any current chest pain or 

shortness of breath. Denies dizziness or lightheadedness or pre syncope 

symptoms. Denies symptoms of orthopnea or PND. EKG sinus rhythm, heart rate 66, 

new ST wave depression in the anterolateral leads. Prior EKG in 2021 sinus 

bradycardia HR 52 with 1st degree AV block. Troponin 0.06-->0.05-->0.04.  Blood 

pressure 130/77, heart rate 55, afebrile, maintaining oxygen saturation is 94% 

on room air. 





DIAGNOSTICS


Telemetry tracings indicate sinus bradycardia heart rate in the 50s


CT brain multiple rounded hypodensity which appear to be less hypertensive and 

on prior study suggested improving areas of focal hemorrhage and/or improving 

hemorrhagic metastases test assess.  No new lesions for new areas of hemorrhage.

 No midline shift.  Persistent moderate hydrocephalus


Chest xray no active acute pulmonary disease.


Laboratory reviewed, sodium 137, potassium 3.9, serum creatinine 0.2, BUN 29, 

WBC 6.8, hemoglobin 13.6, platelets 126, hemoglobin A1c 8.3


Current cardiac medications include atenolol 50 mg daily 





REVIEW OF SYSTEMS


At the time of my exam:


CONSTITUTIONAL: Denies fever or chills.


CARDIOVASCULAR: + chest pain, +shortness of breath, Denies orthopnea, PND or 

palpitations.


RESPIRATORY: Denies cough. 


GASTROINTESTINAL: Denies abdominal pain, diarrhea, constipation, nausea or 

vomiting.


MUSCULOSKELETAL: Denies myalgias.


NEUROLOGIC: +weakness Denies numbness, tingling, headache 


ENDOCRINE: Denies fatigue, weight change,  polydipsia or polyurina.


GENITOURINARY: Denies burning, hematuria or urgency with micturation.


HEMATOLOGIC: Denies history of anemia or bleeding. 





PHYSICAL EXAMINATION


CONSTITUTIONAL: No apparent distress. 


HEENT: Head is normocephalic. Pupils are equal, round. Sclerae anicteric. Mucous

membranes of the mouth are moist.  No JVD. No carotid bruit. Tracheostomy 

present 


CHEST EXAMINATION: Lungs diminished bilaterally to auscultation. No chest wall 

tenderness is noted on palpation or with deep breathing. 


HEART EXAMINATION: Regular rate and rhythm. S1, S2 heard. No murmurs, gallops or

rub.


ABDOMEN: Soft, nontender. Positive bowel sounds.


EXTREMITIES: 2+ peripheral pulses, no lower extremity edema and no calf 

tenderness.


NEUROLOGIC EXAMINATION: Patient is awake, alert and oriented x3. 





ASSESSMENT


NSTEMI


Hypertension


Type 2 Diabetes


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 


History of laryngeal cancer s/p laryngectomy and bilateral lymph node dissection


Tracheostomy


Former nicotine dependence


Former etoh use





PLAN


Obtain 2D echocardiogram 


At this time we will continue medical management and do not recommend cardiac 

catheterization


Start aspirin 81mg daily, atorvastatin 80mg daily, Plavix 75mg daily 


Continue atenolol 50 mg daily 


Continue cardiac telemetry





Nurse Practitioner note has been reviewed, I agree with a documented findings 

and plan of care.  Patient was seen and examined.














Past Medical History


Past Medical History: Cancer, Hypertension, Thyroid Disorder


Additional Past Medical History / Comment(s): Pt recently admitted to [facility] with 

recurrent falls/R leg weakness 2ndary to brain stem lesions/multiple brain 

lesions, hyponatremia.     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 

cancer with laryngectomy/thyroidectomy/neck/throat and nasal reconstruction, 

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 

radiation treatments/now has falls/increased R leg weakness, past skin cancer 

with removals


History of Any Multi-Drug Resistant Organisms: None Reported


Additional Past Surgical History / Comment(s): [facility]: total 

laryngectomy/thyroidectomy, throat/neck/nasal resection with 

tracheostomy/prosthetic speaking valve, 20 brochoscopy with 

BAL/brushings/biopsies, colonoscopy.


Past Anesthesia/Blood Transfusion Reactions: No Reported Reaction


Past Psychological History: No Psychological Hx Reported


Additional Psychological History / Comment(s): CLAUSTROPHOBIA.  Pt resides with 

his spouse.  He has a borrowed walker which is too wide for him and a cane but 

does not use them.  He no longer drives, his spouse takes him to [facility]. Pt is a 

 and receiving home care, PT/OT and nurse thru [facility].  Pt has a 

cane and a walker.


Smoking Status: Former smoker


Past Alcohol Use History: Daily


Additional Past Alcohol Use History / Comment(s): Pt started smoking in  and

quit in .  He was a heavy drinker-6 beers/day but recently quit all 

together.


Past Drug Use History: None Reported





- Past Family History


  ** Father


Family Medical History: Myocardial Infarction (MI)


Additional Family Medical History / Comment(s): Father  of a MI at the age 

of 58yrs.





  ** Mother


Family Medical History: CVA/TIA


Additional Family Medical History / Comment(s): Mother is alive and is 96yrs 

old.





Medications and Allergies


                                Home Medications











 Medication  Instructions  Recorded  Confirmed  Type


 


Levothyroxine Sodium 150 mcg PO DAILY 04/28/21 05/10/21 History


 


Acetaminophen Tab [Tylenol] 650 mg PO Q6HR PRN  tab 04/30/21 05/10/21 Rx


 


Pantoprazole [Protonix] 40 mg PO AC-BID 14 Days #28 04/30/21 05/10/21 Rx





 tablet.   


 


Atenolol [Tenormin] 50 mg PO DAILY 05/10/21 05/10/21 History


 


Dexamethasone [Decadron] 8 mg PO DAILY 05/10/21 05/10/21 History


 


Nystatin 100,000 Unit/ml Susp 500,000 unit PO QID 05/10/21 05/10/21 History





[Mycostatin Oral Susp]    








                                    Allergies











Allergy/AdvReac Type Severity Reaction Status Date / Time


 


No Known Allergies Allergy   Verified 05/10/21 11:59














Physical Exam


Vitals: 


                                   Vital Signs











  Temp Pulse Pulse Resp BP BP Pulse Ox


 


 21 04:35  98.2 F   58 L  20   135/79  94 L


 


 21 00:05  98.2 F   62  20   118/63  95


 


 05/10/21 20:29  97.8 F   59 L  22   143/79  95


 


 05/10/21 19:34  98.2 F  60   18  134/84   94 L


 


 05/10/21 14:00   59 L   16  127/92   95


 


 05/10/21 13:30   58 L   16  131/81   96


 


 05/10/21 13:00   58 L   18  143/80   95


 


 05/10/21 12:30   61   16  124/77   94 L


 


 05/10/21 12:00   60   18  139/81   93 L


 


 05/10/21 10:25  97.8 F  86   18  134/87   94 L








                                Intake and Output











 05/10/21 05/11/21 05/11/21





 22:59 06:59 14:59


 


Output Total 125  


 


Balance -125  


 


Output:   


 


  Urine 125  


 


Other:   


 


  Voiding Method Urinal Urinal 





 Diaper Diaper 


 


  # Voids 1  


 


  Weight 77.564 kg  














Results





                                 21 07:44





                                 21 07:44


                                 Cardiac Enzymes











  05/10/21 05/10/21 05/10/21 Range/Units





  11:05 11:05 18:27 


 


AST  29    (17-59)  U/L


 


Troponin I   0.064 H*  0.050 H*  (0.000-0.034)  ng/mL














  05/10/21 Range/Units





  22:12 


 


AST   (17-59)  U/L


 


Troponin I  0.045 H*  (0.000-0.034)  ng/mL








                                   Coagulation











  05/10/21 Range/Units





  11:05 


 


PT  9.4  (9.0-12.0)  sec


 


APTT  20.0 L  (22.0-30.0)  sec








                                       CBC











  05/10/21 Range/Units





  11:05 


 


WBC  9.9  (3.8-10.6)  k/uL


 


RBC  4.93  (4.30-5.90)  m/uL


 


Hgb  14.3  (13.0-17.5)  gm/dL


 


Hct  43.5  (39.0-53.0)  %


 


Plt Count  145 L  (150-450)  k/uL








                          Comprehensive Metabolic Panel











  05/10/21 Range/Units





  11:05 


 


Sodium  135 L  (137-145)  mmol/L


 


Potassium  4.8  (3.5-5.1)  mmol/L


 


Chloride  104  ()  mmol/L


 


Carbon Dioxide  22  (22-30)  mmol/L


 


BUN  29 H  (9-20)  mg/dL


 


Creatinine  0.78  (0.66-1.25)  mg/dL


 


Glucose  400 H  (74-99)  mg/dL


 


Calcium  8.9  (8.4-10.2)  mg/dL


 


AST  29  (17-59)  U/L


 


ALT  55 H  (4-49)  U/L


 


Alkaline Phosphatase  67  ()  U/L


 


Total Protein  5.6 L  (6.3-8.2)  g/dL


 


Albumin  3.2 L  (3.5-5.0)  g/dL








                               Current Medications











Generic Name Dose Route Start Last Admin





  Trade Name Freq  PRN Reason Stop Dose Admin


 


Acetaminophen  650 mg  05/10/21 18:12 





  Acetaminophen Tab 325 Mg Tab  PO  





  Q6HR PRN  





  Mild Pain or Fever > 100.5  


 


Hydrocodone Bitart/Acetaminophen  1 each  05/10/21 18:14 





  Hydrocodone/Apap 5-325mg 1 Each Tab  PO  





  Q6HR PRN  





  Pain  


 


Atenolol  50 mg  21 09:00 





  Atenolol 50 Mg Tab  PO  





  DAILY [facility]  


 


Dexamethasone  8 mg  21 09:00 





  Dexamethasone 4 Mg Tab  PO  





  DAILY [facility]  


 


Folic Acid  1 mg  21 12:00 





  Folic Acid 1 Mg Tab  PO  





  DAILY@1200 [facility]  


 


Sodium Chloride  1,000 mls @ 20 mls/hr  05/10/21 12:45  05/10/21 13:40





  Saline 0.9%  IV   Not Given





  .Q24H [facility]  


 


Insulin Aspart  0 unit  05/10/21 17:30  05/10/21 21:25





  Insulin Aspart (Novolog) 100 Unit/Ml Vial  SQ   3 unit





  ACHS [facility]   Administration





  Protocol  


 


Insulin Detemir  20 unit  05/10/21 21:00  05/10/21 21:25





  Insulin Detemir (Levemir) 100 Unit/Ml Syr  SQ   20 unit





  [facility]   Administration


 


Levothyroxine Sodium  150 mcg  21 06:30 





  Levothyroxine 75 Mcg Tab  PO  





  0630 [facility]  


 


Multivitamins  1 each  21 12:00 





  Multivitamins, Thera 1 Each Tab  PO  





  DAILY@1200 [facility]  


 


Naloxone HCl  0.2 mg  05/10/21 12:36 





  Naloxone 0.4 Mg/Ml 1 Ml Vial  IV  





  Q2M PRN  





  Opioid Reversal  


 


Nystatin  500,000 unit  05/10/21 18:15  21 00:15





  Nystatin 100,000 Unit/Ml Susp 500,000 Unit/5 Ml Cup  PO   500,000 unit





  QID [facility]   Administration


 


Pantoprazole Sodium  40 mg  05/10/21 18:15  05/10/21 21:24





  Pantoprazole 40 Mg Tablet  PO   40 mg





  AC-BID [facility]   Administration


 


Thiamine HCl  100 mg  21 12:00 





  Thiamine 100 Mg Tab  PO  





  DAILY@1200 [facility]  








                                Intake and Output











 05/10/21 05/11/21 05/11/21





 22:59 06:59 14:59


 


Output Total 125  


 


Balance -125  


 


Output:   


 


  Urine 125  


 


Other:   


 


  Voiding Method Urinal Urinal 





 Diaper Diaper 


 


  # Voids 1  


 


  Weight 77.564 kg  








                                        





                                 05/10/21 11:05 





                                 05/10/21 11:05

## 2021-05-17 ENCOUNTER — HOSPITAL ENCOUNTER (INPATIENT)
Dept: HOSPITAL 47 - EC | Age: 71
LOS: 2 days | Discharge: ANOTHER HOSPITAL | DRG: 54 | End: 2021-05-19
Attending: HOSPITALIST | Admitting: HOSPITALIST
Payer: OTHER GOVERNMENT

## 2021-05-17 DIAGNOSIS — E11.65: ICD-10-CM

## 2021-05-17 DIAGNOSIS — J96.01: ICD-10-CM

## 2021-05-17 DIAGNOSIS — Z85.118: ICD-10-CM

## 2021-05-17 DIAGNOSIS — Z79.890: ICD-10-CM

## 2021-05-17 DIAGNOSIS — Z92.21: ICD-10-CM

## 2021-05-17 DIAGNOSIS — Z85.819: ICD-10-CM

## 2021-05-17 DIAGNOSIS — I10: ICD-10-CM

## 2021-05-17 DIAGNOSIS — E87.2: ICD-10-CM

## 2021-05-17 DIAGNOSIS — G91.9: ICD-10-CM

## 2021-05-17 DIAGNOSIS — Z79.899: ICD-10-CM

## 2021-05-17 DIAGNOSIS — Z92.3: ICD-10-CM

## 2021-05-17 DIAGNOSIS — J18.9: ICD-10-CM

## 2021-05-17 DIAGNOSIS — Z79.4: ICD-10-CM

## 2021-05-17 DIAGNOSIS — R29.6: ICD-10-CM

## 2021-05-17 DIAGNOSIS — E22.2: ICD-10-CM

## 2021-05-17 DIAGNOSIS — Z79.82: ICD-10-CM

## 2021-05-17 DIAGNOSIS — J44.0: ICD-10-CM

## 2021-05-17 DIAGNOSIS — Z20.822: ICD-10-CM

## 2021-05-17 DIAGNOSIS — G93.41: ICD-10-CM

## 2021-05-17 DIAGNOSIS — E78.5: ICD-10-CM

## 2021-05-17 DIAGNOSIS — C34.90: ICD-10-CM

## 2021-05-17 DIAGNOSIS — Z85.841: ICD-10-CM

## 2021-05-17 DIAGNOSIS — E89.0: ICD-10-CM

## 2021-05-17 DIAGNOSIS — D69.6: ICD-10-CM

## 2021-05-17 DIAGNOSIS — F40.240: ICD-10-CM

## 2021-05-17 DIAGNOSIS — Z85.21: ICD-10-CM

## 2021-05-17 DIAGNOSIS — Z77.098: ICD-10-CM

## 2021-05-17 DIAGNOSIS — Z82.49: ICD-10-CM

## 2021-05-17 DIAGNOSIS — C79.31: Primary | ICD-10-CM

## 2021-05-17 DIAGNOSIS — Z87.891: ICD-10-CM

## 2021-05-17 DIAGNOSIS — Z93.0: ICD-10-CM

## 2021-05-17 DIAGNOSIS — Z85.828: ICD-10-CM

## 2021-05-17 LAB
ALBUMIN SERPL-MCNC: 2.9 G/DL (ref 3.5–5)
ALP SERPL-CCNC: 70 U/L (ref 38–126)
ALT SERPL-CCNC: 44 U/L (ref 4–49)
ANION GAP SERPL CALC-SCNC: 6 MMOL/L
APTT BLD: 19.3 SEC (ref 22–30)
AST SERPL-CCNC: 29 U/L (ref 17–59)
BASOPHILS # BLD AUTO: 0 K/UL (ref 0–0.2)
BASOPHILS NFR BLD AUTO: 0 %
BUN SERPL-SCNC: 20 MG/DL (ref 9–20)
CALCIUM SPEC-MCNC: 8.5 MG/DL (ref 8.4–10.2)
CHLORIDE SERPL-SCNC: 103 MMOL/L (ref 98–107)
CO2 SERPL-SCNC: 26 MMOL/L (ref 22–30)
EOSINOPHIL # BLD AUTO: 0 K/UL (ref 0–0.7)
EOSINOPHIL NFR BLD AUTO: 1 %
ERYTHROCYTE [DISTWIDTH] IN BLOOD BY AUTOMATED COUNT: 4.45 M/UL (ref 4.3–5.9)
ERYTHROCYTE [DISTWIDTH] IN BLOOD: 16.2 % (ref 11.5–15.5)
GLUCOSE BLD-MCNC: 124 MG/DL (ref 75–99)
GLUCOSE SERPL-MCNC: 157 MG/DL (ref 74–99)
HCT VFR BLD AUTO: 38.9 % (ref 39–53)
HGB BLD-MCNC: 13.5 GM/DL (ref 13–17.5)
INR PPP: 1 (ref ?–1.2)
LYMPHOCYTES # SPEC AUTO: 0.5 K/UL (ref 1–4.8)
LYMPHOCYTES NFR SPEC AUTO: 14 %
MAGNESIUM SPEC-SCNC: 1.8 MG/DL (ref 1.6–2.3)
MCH RBC QN AUTO: 30.2 PG (ref 25–35)
MCHC RBC AUTO-ENTMCNC: 34.5 G/DL (ref 31–37)
MCV RBC AUTO: 87.6 FL (ref 80–100)
MONOCYTES # BLD AUTO: 0.1 K/UL (ref 0–1)
MONOCYTES NFR BLD AUTO: 3 %
NEUTROPHILS # BLD AUTO: 3.1 K/UL (ref 1.3–7.7)
NEUTROPHILS NFR BLD AUTO: 81 %
PLATELET # BLD AUTO: 122 K/UL (ref 150–450)
POTASSIUM SERPL-SCNC: 4 MMOL/L (ref 3.5–5.1)
PROT SERPL-MCNC: 5.3 G/DL (ref 6.3–8.2)
PT BLD: 10.3 SEC (ref 9–12)
SODIUM SERPL-SCNC: 135 MMOL/L (ref 137–145)
WBC # BLD AUTO: 3.8 K/UL (ref 3.8–10.6)

## 2021-05-17 PROCEDURE — 36415 COLL VENOUS BLD VENIPUNCTURE: CPT

## 2021-05-17 PROCEDURE — 80053 COMPREHEN METABOLIC PANEL: CPT

## 2021-05-17 PROCEDURE — 84484 ASSAY OF TROPONIN QUANT: CPT

## 2021-05-17 PROCEDURE — 87635 SARS-COV-2 COVID-19 AMP PRB: CPT

## 2021-05-17 PROCEDURE — 84439 ASSAY OF FREE THYROXINE: CPT

## 2021-05-17 PROCEDURE — 81001 URINALYSIS AUTO W/SCOPE: CPT

## 2021-05-17 PROCEDURE — 85025 COMPLETE CBC W/AUTO DIFF WBC: CPT

## 2021-05-17 PROCEDURE — 84145 PROCALCITONIN (PCT): CPT

## 2021-05-17 PROCEDURE — 71046 X-RAY EXAM CHEST 2 VIEWS: CPT

## 2021-05-17 PROCEDURE — 83735 ASSAY OF MAGNESIUM: CPT

## 2021-05-17 PROCEDURE — 87205 SMEAR GRAM STAIN: CPT

## 2021-05-17 PROCEDURE — 93005 ELECTROCARDIOGRAM TRACING: CPT

## 2021-05-17 PROCEDURE — 85610 PROTHROMBIN TIME: CPT

## 2021-05-17 PROCEDURE — 83605 ASSAY OF LACTIC ACID: CPT

## 2021-05-17 PROCEDURE — 87070 CULTURE OTHR SPECIMN AEROBIC: CPT

## 2021-05-17 PROCEDURE — 70450 CT HEAD/BRAIN W/O DYE: CPT

## 2021-05-17 PROCEDURE — 99285 EMERGENCY DEPT VISIT HI MDM: CPT

## 2021-05-17 PROCEDURE — 84443 ASSAY THYROID STIM HORMONE: CPT

## 2021-05-17 PROCEDURE — 80048 BASIC METABOLIC PNL TOTAL CA: CPT

## 2021-05-17 PROCEDURE — 85730 THROMBOPLASTIN TIME PARTIAL: CPT

## 2021-05-17 PROCEDURE — 82533 TOTAL CORTISOL: CPT

## 2021-05-17 NOTE — CT
EXAMINATION TYPE: CT brain wo con

 

DATE OF EXAM: 5/17/2021

 

COMPARISON: CT brain 5/10/2021

 

HISTORY: weakness hx brain mets

 

CT DLP: 1200 mGycm

Automated exposure control for dose reduction was used. Helical imaging through the brain

 

FINDINGS: 

Multiple metastatic foci are again noted within the brain as on prior exam. Cortical atrophy is noted
. There is no midline shift. No evident hemorrhage. Inflammatory change present within the sphenoid s
inus. Periventricular white matter shows patchy low attenuation. Ventricles are prominent as on prior
 exam.

 

IMPRESSION: 

METASTATIC DISEASE SHOWS A SIMILAR APPEARANCE TO PRIOR EXAM

## 2021-05-17 NOTE — XR
EXAMINATION TYPE: XR chest 2V

 

DATE OF EXAM: 5/17/2021

 

COMPARISON: Chest x-ray 5/10/2021

 

HISTORY: Weakness

 

TECHNIQUE:  Frontal and lateral views of the chest are obtained.

 

FINDINGS:  Patchy bilateral airspace disease is present. No evident pneumothorax or pleural effusion.
 Cardiac mediastinal silhouette is stable.

 

IMPRESSION:  Correlate for pneumonia.

## 2021-05-17 NOTE — HP
HISTORY AND PHYSICAL



DATE OF SERVICE:

05/17/2021



CHIEF COMPLAINT:

Weakness.



HISTORY OF PRESENT ILLNESS:

This 70-year-old gentleman with a past medical history of multiple medical problems,

including history of diabetes mellitus, type 2, history of hypertension, history of

hypothyroidism, history of laryngectomy, history of throat cancer and nasal

reconstruction, history of nicotine dependence, being followed by Dr. Gregorio Randall

in the outpatient setting, was recently admitted with change in mental status thought

to be because of acute metabolic encephalopathy because of uncontrolled diabetes

mellitus, type 2.  The patient also had small-cell lung cancer with multiple metastases

in the brain.  The patient was receiving radiation.  Today the patient is complaining

of generalized weakness.  The patient's wife reports that the patient is unable to

speak because of the tracheostomy, but the patient has been steroids, which he

completed yesterday.  Dr. Villarreal of Radiation Oncology is also following the patient

closely. A neurosurgery consultation has not been obtained so far.  The patient has

been seen by the hematology/oncology team. There is no history of any fever, rigor or

chills. No history of trauma. A detailed history could not be taken from the patient.

Most of the history is taken from my discussion with the ER physician as well as

discussion with the patient's wife at the bedside.



PAST MEDICAL HISTORY:

History of lung cancer with metastases to the brain, hypertension, hypothyroidism,

history of recurrent falls, history of laryngectomy/thyroidectomy, neck and throat and

nasal reconstruction at [facility], history of claustrophobia, history of nicotine

dependence.



HOME MEDICATIONS:

Vitamin B1, Protonix, nystatin, multivitamins, levothyroxine, Levemir, folic acid,

Tenormin, aspirin, Tylenol, Lipitor.



ALLERGIES:

NONE.



FAMILY HISTORY:

History of myocardial infarction in the family.



SOCIAL HISTORY:

No history of smoking. No history of alcohol intake.



Review of systems could not be taken because of the patient's tracheostomy and

baseline.



PHYSICAL EXAMINATION:

Pulse is 92, blood pressure 120/84, respiration 18, temperature 99.2, pulse ox 92%

_____. Patient is conscious, able to follow commands.

HEENT: Conjunctivae normal.

NECK: Tracheostomy.

CARDIOVASCULAR SYSTEM:  S1, S2 muffled.

RESPIRATORY SYSTEM: Breath sounds diminished at the bases.  A few scattered rhonchi and

crackles.

ABDOMEN: Soft, non-tender. No mass palpable.

LEGS: No edema. No swelling.

NERVOUS SYSTEM: Higher functions as mentioned earlier. Moves all 4 limbs. No focal

motor or sensory deficit.

LYMPHATICS: No lymph node palpable in neck, axillae or groin.

SKIN: No ulcer, rash, bleeding.

JOINTS: No active deforming arthropathy.



LABS:

WBC 3.8, hemoglobin 13.5, platelets 122, and lymphocytes 0.5. Sodium is 135. Glucose

157.



ASSESSMENT:

1. Gait dysfunction and weakness, possibly secondary to cerebral metastases.

2. Thrombocytopenia.

3. Hyponatremia.

4. History of recent change in mental status, metabolic acidosis secondary to

    uncontrolled diabetes mellitus, type 2.

5. History of recent hyperosmolar coma induced by steroids.

6. Small-cell lung cancer with multiple metastases in the brain, status post

    radiation.

7. Hypertension.

8. Hypothyroidism.

9. Gait dysfunction.

10.History of laryngectomy, thyroidectomy, and throat and nasal reconstruction.

11.History of nicotine dependence.

12.History of claustrophobia.

13.FULL CODE.



RECOMMENDATIONS AND DISCUSSION:

In this 70-year-old gentleman who presented with multiple complex medical issues, we

will monitor the patient closely, continue the current medications, continue

symptomatic treatment. CT scan of the brain was done;  official report is pending at

this time.  A chest x-ray, which I reviewed personally, showed suspicious pneumonia

bilaterally, left more than the right.  I would recommend broad-spectrum IV antibiotics

and consult Hematology/Oncology as well as Radiation Oncology. Otherwise, resume the

home medications when they are confirmed. DVT prophylaxis. PT/OT evaluation. Prognosis

is guarded because of multiple complex medical issues. Further recommendations to

follow. A copy of this dictation is being forwarded to Dr. Randall, who is the

primary physician.





MMODL / IJN: [id number] / Job#: [id number]

## 2021-05-17 NOTE — ED
General Adult HPI





- General


Chief complaint: Extremity Problem,Nontraumatic


Stated complaint: Weakness


Time Seen by Provider: 21 17:42


Source: patient, family, EMS, RN notes reviewed, old records reviewed


Mode of arrival: EMS


Limitations: language barrier





- History of Present Illness


Initial comments: 





70-year-old male who had presented for difficulty ambulating, generalized 

weakness.  Patient is accompanied by his wife was able to give a history.  

Patient himself is alert but is status post trach and unable to speak.  He has 

history of metastatic brain cancer, wife believes that his primary cancer is 

long.  He had been on steroids but this was completed yesterday.  He follows 

with radiation oncology and general oncology.  He has not had brain surgery.





- Related Data


                                Home Medications











 Medication  Instructions  Recorded  Confirmed


 


Levothyroxine Sodium 150 mcg PO DAILY 21


 


Atenolol [Tenormin] 50 mg PO DAILY 05/10/21 05/17/21


 


Nystatin 100,000 Unit/ml Susp 500,000 unit PO QID 05/10/21 05/17/21





[Mycostatin Oral Susp]   


 


Atorvastatin [Lipitor] 40 mg PO HS 21


 


Multivitamins, Thera [Multivitamin 1 tab PO HS 21





(formulary)]   








                                  Previous Rx's











 Medication  Instructions  Recorded


 


Acetaminophen Tab [Tylenol] 650 mg PO Q6HR PRN  tab 21


 


Pantoprazole [Protonix] 40 mg PO AC-BID 14 Days #28 21





 tablet. 


 


Aspirin 81 mg PO DAILY 30 Days #30 chew 21


 


Folic Acid 1 mg PO DAILY@1200 30 Days #30 tab 21


 


Insulin Detemir (Levemir) [Levemir] 15 unit SQ HS 30 Days #4 syr 21


 


Thiamine [Vitamin B-1] 100 mg PO DAILY@1200 30 Days #30 21





 tab 











                                    Allergies











Allergy/AdvReac Type Severity Reaction Status Date / Time


 


No Known Allergies Allergy   Verified 21 18:14














Review of Systems


ROS Statement: 


Those systems with pertinent positive or pertinent negative responses have been 

documented in the HPI.





ROS Other: All systems not noted in ROS Statement are negative.





Past Medical History


Past Medical History: Cancer, Hypertension, Thyroid Disorder


Additional Past Medical History / Comment(s): Pt recently admitted to [facility] with 

recurrent falls/R leg weakness 2ndary to brain stem lesions/multiple brain 

lesions, hyponatremia.     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 

cancer with laryngectomy/thyroidectomy/neck/throat and nasal reconstruction, 

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 

radiation treatments/now has falls/increased R leg weakness, past skin cancer 

with removals


History of Any Multi-Drug Resistant Organisms: None Reported


Additional Past Surgical History / Comment(s): [facility]: total 

laryngectomy/thyroidectomy, throat/neck/nasal resection with 

tracheostomy/prosthetic speaking valve, 20 brochoscopy with 

BAL/brushings/biopsies, colonoscopy.


Past Anesthesia/Blood Transfusion Reactions: No Reported Reaction


Past Psychological History: No Psychological Hx Reported


Smoking Status: Former smoker


Past Alcohol Use History: Daily


Past Drug Use History: None Reported





- Past Family History


  ** Father


Family Medical History: Myocardial Infarction (MI)


Additional Family Medical History / Comment(s): Father  of a MI at the age 

of 58yrs.





  ** Mother


Family Medical History: CVA/TIA


Additional Family Medical History / Comment(s): Mother is alive and is 96yrs 

old.





General Exam


Limitations: language barrier


General appearance: alert, in no apparent distress


Head exam: Present: atraumatic, normocephalic


Eye exam: Present: normal appearance, PERRL


ENT exam: Present: mucous membranes dry


Respiratory exam: Present: normal lung sounds bilaterally.  Absent: respiratory 

distress, wheezes, stridor


Cardiovascular Exam: Present: regular rate, normal rhythm


GI/Abdominal exam: Present: soft.  Absent: distended, tenderness, guarding


Neurological exam: Present: alert, other (Patient is able to move both lower 

extremities although he is weak bilaterally.  Upper extremities are strong, 5 

out of 5.)


Skin exam: Present: warm, dry, intact.  Absent: cyanosis, diaphoretic





Course


                                   Vital Signs











  21





  17:16 19:59


 


Temperature 99.5 F 


 


Pulse Rate 70 92


 


Respiratory 18 18





Rate  


 


Blood Pressure 126/82 120/84


 


O2 Sat by Pulse 98 92 L





Oximetry  














EKG Findings





- EKG Comments:


EKG Findings:: EKG: Normal sinus rhythm, no ST segment elevation, rate of 68, DC

interval 192, QRS duration 98, 





Medical Decision Making





- Medical Decision Making





CT showing similar exam, no intracranial hemorrhage or metastatic disease.  

Chest x-ray concerning for bilateral pneumonia.  No fever, no leukocytosis 

however antibiotics are initiated for this patient whose wife states that he has

had increased sputum production.  Case discussed with Dr. Macdonald who will admit.





- Lab Data


Result diagrams: 


                                 21 18:15





                                 21 18:15


                                   Lab Results











  21 Range/Units





  18:15 18:15 18:15 


 


WBC  3.8    (3.8-10.6)  k/uL


 


RBC  4.45    (4.30-5.90)  m/uL


 


Hgb  13.5    (13.0-17.5)  gm/dL


 


Hct  38.9 L    (39.0-53.0)  %


 


MCV  87.6    (80.0-100.0)  fL


 


MCH  30.2    (25.0-35.0)  pg


 


MCHC  34.5    (31.0-37.0)  g/dL


 


RDW  16.2 H    (11.5-15.5)  %


 


Plt Count  122 L    (150-450)  k/uL


 


MPV  7.0    


 


Neutrophils %  81    %


 


Lymphocytes %  14    %


 


Monocytes %  3    %


 


Eosinophils %  1    %


 


Basophils %  0    %


 


Neutrophils #  3.1    (1.3-7.7)  k/uL


 


Lymphocytes #  0.5 L    (1.0-4.8)  k/uL


 


Monocytes #  0.1    (0-1.0)  k/uL


 


Eosinophils #  0.0    (0-0.7)  k/uL


 


Basophils #  0.0    (0-0.2)  k/uL


 


Manual Slide Review  Performed    


 


Anisocytosis  Slight    


 


PT   10.3   (9.0-12.0)  sec


 


INR   1.0   (<1.2)  


 


APTT   19.3 L   (22.0-30.0)  sec


 


Sodium    135 L  (137-145)  mmol/L


 


Potassium    4.0  (3.5-5.1)  mmol/L


 


Chloride    103  ()  mmol/L


 


Carbon Dioxide    26  (22-30)  mmol/L


 


Anion Gap    6  mmol/L


 


BUN    20  (9-20)  mg/dL


 


Creatinine    0.90  (0.66-1.25)  mg/dL


 


Est GFR (CKD-EPI)AfAm    >90  (>60 ml/min/1.73 sqM)  


 


Est GFR (CKD-EPI)NonAf    85  (>60 ml/min/1.73 sqM)  


 


Glucose    157 H  (74-99)  mg/dL


 


Plasma Lactic Acid Buddy     (0.7-2.0)  mmol/L


 


Calcium    8.5  (8.4-10.2)  mg/dL


 


Magnesium    1.8  (1.6-2.3)  mg/dL


 


Total Bilirubin    0.8  (0.2-1.3)  mg/dL


 


AST    29  (17-59)  U/L


 


ALT    44  (4-49)  U/L


 


Alkaline Phosphatase    70  ()  U/L


 


Troponin I     (0.000-0.034)  ng/mL


 


Total Protein    5.3 L  (6.3-8.2)  g/dL


 


Albumin    2.9 L  (3.5-5.0)  g/dL














  21 Range/Units





  18:15 18:15 


 


WBC    (3.8-10.6)  k/uL


 


RBC    (4.30-5.90)  m/uL


 


Hgb    (13.0-17.5)  gm/dL


 


Hct    (39.0-53.0)  %


 


MCV    (80.0-100.0)  fL


 


MCH    (25.0-35.0)  pg


 


MCHC    (31.0-37.0)  g/dL


 


RDW    (11.5-15.5)  %


 


Plt Count    (150-450)  k/uL


 


MPV    


 


Neutrophils %    %


 


Lymphocytes %    %


 


Monocytes %    %


 


Eosinophils %    %


 


Basophils %    %


 


Neutrophils #    (1.3-7.7)  k/uL


 


Lymphocytes #    (1.0-4.8)  k/uL


 


Monocytes #    (0-1.0)  k/uL


 


Eosinophils #    (0-0.7)  k/uL


 


Basophils #    (0-0.2)  k/uL


 


Manual Slide Review    


 


Anisocytosis    


 


PT    (9.0-12.0)  sec


 


INR    (<1.2)  


 


APTT    (22.0-30.0)  sec


 


Sodium    (137-145)  mmol/L


 


Potassium    (3.5-5.1)  mmol/L


 


Chloride    ()  mmol/L


 


Carbon Dioxide    (22-30)  mmol/L


 


Anion Gap    mmol/L


 


BUN    (9-20)  mg/dL


 


Creatinine    (0.66-1.25)  mg/dL


 


Est GFR (CKD-EPI)AfAm    (>60 ml/min/1.73 sqM)  


 


Est GFR (CKD-EPI)NonAf    (>60 ml/min/1.73 sqM)  


 


Glucose    (74-99)  mg/dL


 


Plasma Lactic Acid Buddy  1.4   (0.7-2.0)  mmol/L


 


Calcium    (8.4-10.2)  mg/dL


 


Magnesium    (1.6-2.3)  mg/dL


 


Total Bilirubin    (0.2-1.3)  mg/dL


 


AST    (17-59)  U/L


 


ALT    (4-49)  U/L


 


Alkaline Phosphatase    ()  U/L


 


Troponin I   0.018  (0.000-0.034)  ng/mL


 


Total Protein    (6.3-8.2)  g/dL


 


Albumin    (3.5-5.0)  g/dL














Disposition


Clinical Impression: 


 Small cell lung cancer, Brain metastasis, Lower extremity weakness, Pneumonia





Disposition: ADMITTED AS IP TO THIS [facility]


Condition: Stable


Is patient prescribed a controlled substance at d/c from ED?: No


Referrals: 


[facility] [Primary Care Provider] - 1-2 days


Decision to Admit Reason: Admit from EC


Decision Date: 21


Decision Time: 21:16

## 2021-05-18 LAB
ANION GAP SERPL CALC-SCNC: 4 MMOL/L
BASOPHILS # BLD AUTO: 0 K/UL (ref 0–0.2)
BASOPHILS NFR BLD AUTO: 1 %
BUN SERPL-SCNC: 17 MG/DL (ref 9–20)
CALCIUM SPEC-MCNC: 8.4 MG/DL (ref 8.4–10.2)
CHLORIDE SERPL-SCNC: 104 MMOL/L (ref 98–107)
CO2 SERPL-SCNC: 25 MMOL/L (ref 22–30)
EOSINOPHIL # BLD AUTO: 0 K/UL (ref 0–0.7)
EOSINOPHIL NFR BLD AUTO: 0 %
ERYTHROCYTE [DISTWIDTH] IN BLOOD BY AUTOMATED COUNT: 4.54 M/UL (ref 4.3–5.9)
ERYTHROCYTE [DISTWIDTH] IN BLOOD: 16.4 % (ref 11.5–15.5)
GLUCOSE BLD-MCNC: 128 MG/DL (ref 75–99)
GLUCOSE BLD-MCNC: 148 MG/DL (ref 75–99)
GLUCOSE BLD-MCNC: 202 MG/DL (ref 75–99)
GLUCOSE BLD-MCNC: 239 MG/DL (ref 75–99)
GLUCOSE BLD-MCNC: 86 MG/DL (ref 75–99)
GLUCOSE SERPL-MCNC: 103 MG/DL (ref 74–99)
HCT VFR BLD AUTO: 39.4 % (ref 39–53)
HGB BLD-MCNC: 13.7 GM/DL (ref 13–17.5)
HYALINE CASTS UR QL AUTO: 1 /LPF (ref 0–2)
LYMPHOCYTES # SPEC AUTO: 0.7 K/UL (ref 1–4.8)
LYMPHOCYTES NFR SPEC AUTO: 20 %
MCH RBC QN AUTO: 30.2 PG (ref 25–35)
MCHC RBC AUTO-ENTMCNC: 34.9 G/DL (ref 31–37)
MCV RBC AUTO: 86.7 FL (ref 80–100)
MONOCYTES # BLD AUTO: 0.1 K/UL (ref 0–1)
MONOCYTES NFR BLD AUTO: 2 %
NEUTROPHILS # BLD AUTO: 2.5 K/UL (ref 1.3–7.7)
NEUTROPHILS NFR BLD AUTO: 77 %
PH UR: 5.5 [PH] (ref 5–8)
PLATELET # BLD AUTO: 113 K/UL (ref 150–450)
POTASSIUM SERPL-SCNC: 3.6 MMOL/L (ref 3.5–5.1)
PROT UR QL: (no result)
RBC UR QL: 2 /HPF (ref 0–5)
SODIUM SERPL-SCNC: 133 MMOL/L (ref 137–145)
SP GR UR: 1.03 (ref 1–1.03)
UROBILINOGEN UR QL STRIP: <2 MG/DL (ref ?–2)
WBC # BLD AUTO: 3.3 K/UL (ref 3.8–10.6)
WBC # UR AUTO: 2 /HPF (ref 0–5)

## 2021-05-18 RX ADMIN — THERA TABS SCH EACH: TAB at 00:23

## 2021-05-18 RX ADMIN — PANTOPRAZOLE SODIUM SCH MG: 40 TABLET, DELAYED RELEASE ORAL at 07:40

## 2021-05-18 RX ADMIN — Medication SCH MG: at 11:46

## 2021-05-18 RX ADMIN — NYSTATIN SCH UNIT: 100000 SUSPENSION ORAL at 00:27

## 2021-05-18 RX ADMIN — NYSTATIN SCH UNIT: 100000 SUSPENSION ORAL at 17:08

## 2021-05-18 RX ADMIN — INSULIN ASPART SCH UNIT: 100 INJECTION, SOLUTION INTRAVENOUS; SUBCUTANEOUS at 20:08

## 2021-05-18 RX ADMIN — INSULIN ASPART SCH: 100 INJECTION, SOLUTION INTRAVENOUS; SUBCUTANEOUS at 17:09

## 2021-05-18 RX ADMIN — NYSTATIN SCH UNIT: 100000 SUSPENSION ORAL at 07:40

## 2021-05-18 RX ADMIN — INSULIN DETEMIR SCH UNIT: 100 INJECTION, SOLUTION SUBCUTANEOUS at 21:22

## 2021-05-18 RX ADMIN — ATORVASTATIN CALCIUM SCH MG: 40 TABLET, FILM COATED ORAL at 20:08

## 2021-05-18 RX ADMIN — INSULIN ASPART SCH: 100 INJECTION, SOLUTION INTRAVENOUS; SUBCUTANEOUS at 00:24

## 2021-05-18 RX ADMIN — LEVOTHYROXINE SODIUM SCH MCG: 75 TABLET ORAL at 04:44

## 2021-05-18 RX ADMIN — AZITHROMYCIN SCH MG: 500 TABLET, FILM COATED ORAL at 11:46

## 2021-05-18 RX ADMIN — NYSTATIN SCH UNIT: 100000 SUSPENSION ORAL at 11:47

## 2021-05-18 RX ADMIN — INSULIN ASPART SCH: 100 INJECTION, SOLUTION INTRAVENOUS; SUBCUTANEOUS at 11:44

## 2021-05-18 RX ADMIN — PANTOPRAZOLE SODIUM SCH MG: 40 TABLET, DELAYED RELEASE ORAL at 17:08

## 2021-05-18 RX ADMIN — INSULIN DETEMIR SCH UNIT: 100 INJECTION, SOLUTION SUBCUTANEOUS at 00:27

## 2021-05-18 RX ADMIN — ATORVASTATIN CALCIUM SCH MG: 40 TABLET, FILM COATED ORAL at 00:23

## 2021-05-18 RX ADMIN — INSULIN ASPART SCH: 100 INJECTION, SOLUTION INTRAVENOUS; SUBCUTANEOUS at 07:39

## 2021-05-18 RX ADMIN — FOLIC ACID SCH MG: 1 TABLET ORAL at 11:46

## 2021-05-18 RX ADMIN — THERA TABS SCH EACH: TAB at 20:08

## 2021-05-18 RX ADMIN — NYSTATIN SCH UNIT: 100000 SUSPENSION ORAL at 21:22

## 2021-05-18 NOTE — P.PN
Subjective





This is a pleasant 70 years old male with multiple medical problems as above.  

Presents with inability to walk and generalized weakness.  Please refer to 

oncology team note showing patient has recent history of laryngeal cancer and 

status post laryngectomy and tracheostomy is in place.  Followed by small cell 

lung cancer with metastasis to brain, his disease looks responded to radiation 

therapy to his brain where all lesions become smaller except one suspected is 

due to hemorrhagic transformation.


Patient is lying in bed, no significant complaint.  He feels generally weak.  

Breathing is quiet, no coughing, no chest pain.


He is hemodynamically stable and not showing only mild low platelets at 113 

gait.


Patient is suspected to have bilateral pneumonia and that's why he 

ceftriaxoneandZithromaxandpulmonaryteamonthecasehoweverheisonroomairwi

ajpgixxygmsfkzcxlcowrhvej82%.


Oncology team recommended neurosurgical evaluation, however as per staff this 

can be done as an outpatient.Wife at bedside and also wants to go as an 

outpatient as he has an appointment with neurosurgery on 5/20.


Patient also is currently on dexamethasone 4 mg.





Objective





- Vital Signs


Vital signs: 


                                   Vital Signs











Temp  98.1 F   05/18/21 14:00


 


Pulse  73   05/18/21 14:00


 


Resp  17   05/18/21 14:00


 


BP  112/76   05/18/21 14:00


 


Pulse Ox  93 L  05/18/21 14:00








                                 Intake & Output











 05/17/21 05/18/21 05/18/21





 18:59 06:59 18:59


 


Weight 77.111 kg 77.111 kg 


 


Other:   


 


  Voiding Method   Incontinent





   External Catheter


 


  # Voids  1 














- Exam





-GENERAL: The patient is alert and oriented, lethargic, not in any acute 

distress.  Generally weak


-HEENT: Pupils are round and equally reacting to light. EOMI. No scleral 

icterus. No conjunctival pallor. Normocephalic, atraumatic. No pharyngeal 

erythema. No thyromegaly.  Tracheostomy is in place


CARDIOVASCULAR: S1 and S2 present. No murmurs, rubs, or gallops. 


PULMONARY: Chest is clear to auscultation, no wheezing or crackles. 


ABDOMEN: Soft, nontender, nondistended, normoactive bowel sounds. No palpable 

organomegaly. 


MUSCULOSKELETAL: No joint swelling or deformity. 


EXTREMITIES: No cyanosis, clubbing, or pedal edema. 


NEUROLOGICAL: Gross neurological examination did not reveal any focal deficits. 


SKIN: No rashes. no petechiae.





- Labs


CBC & Chem 7: 


                                 05/18/21 05:51





                                 05/18/21 05:51


Labs: 


                  Abnormal Lab Results - Last 24 Hours (Table)











  05/17/21 05/17/21 05/17/21 Range/Units





  18:15 18:15 18:15 


 


WBC     (3.8-10.6)  k/uL


 


Hct  38.9 L    (39.0-53.0)  %


 


RDW  16.2 H    (11.5-15.5)  %


 


Plt Count  122 L    (150-450)  k/uL


 


Lymphocytes #  0.5 L    (1.0-4.8)  k/uL


 


APTT   19.3 L   (22.0-30.0)  sec


 


Sodium    135 L  (137-145)  mmol/L


 


Glucose    157 H  (74-99)  mg/dL


 


POC Glucose (mg/dL)     (75-99)  mg/dL


 


Total Protein    5.3 L  (6.3-8.2)  g/dL


 


Albumin    2.9 L  (3.5-5.0)  g/dL


 


Urine Protein     (Negative)  


 


Urine Mucus     (None)  /hpf














  05/17/21 05/18/21 05/18/21 Range/Units





  21:34 00:16 05:51 


 


WBC    3.3 L  (3.8-10.6)  k/uL


 


Hct     (39.0-53.0)  %


 


RDW    16.4 H  (11.5-15.5)  %


 


Plt Count    113 L  (150-450)  k/uL


 


Lymphocytes #    0.7 L  (1.0-4.8)  k/uL


 


APTT     (22.0-30.0)  sec


 


Sodium     (137-145)  mmol/L


 


Glucose     (74-99)  mg/dL


 


POC Glucose (mg/dL)  124 H  148 H   (75-99)  mg/dL


 


Total Protein     (6.3-8.2)  g/dL


 


Albumin     (3.5-5.0)  g/dL


 


Urine Protein     (Negative)  


 


Urine Mucus     (None)  /hpf














  05/18/21 05/18/21 05/18/21 Range/Units





  05:51 09:00 11:41 


 


WBC     (3.8-10.6)  k/uL


 


Hct     (39.0-53.0)  %


 


RDW     (11.5-15.5)  %


 


Plt Count     (150-450)  k/uL


 


Lymphocytes #     (1.0-4.8)  k/uL


 


APTT     (22.0-30.0)  sec


 


Sodium  133 L    (137-145)  mmol/L


 


Glucose  103 H    (74-99)  mg/dL


 


POC Glucose (mg/dL)    128 H  (75-99)  mg/dL


 


Total Protein     (6.3-8.2)  g/dL


 


Albumin     (3.5-5.0)  g/dL


 


Urine Protein   1+ H   (Negative)  


 


Urine Mucus   Occasional H   (None)  /hpf














  05/18/21 Range/Units





  16:50 


 


WBC   (3.8-10.6)  k/uL


 


Hct   (39.0-53.0)  %


 


RDW   (11.5-15.5)  %


 


Plt Count   (150-450)  k/uL


 


Lymphocytes #   (1.0-4.8)  k/uL


 


APTT   (22.0-30.0)  sec


 


Sodium   (137-145)  mmol/L


 


Glucose   (74-99)  mg/dL


 


POC Glucose (mg/dL)  202 H  (75-99)  mg/dL


 


Total Protein   (6.3-8.2)  g/dL


 


Albumin   (3.5-5.0)  g/dL


 


Urine Protein   (Negative)  


 


Urine Mucus   (None)  /hpf














Assessment and Plan


Assessment: 





Inability to walk, suspected secondary to hydrocephalus and brain metastasis is


Small cell lung cancer with brain metastases status post radiotherapy


Recent history of laryngeal cancer, status post tracheostomy


Possible bilateral pneumonia


Diabetes mellitus


Mild acute hypoxic respiratory failure.


Mild thrombocytopenia














Plan: 





This is a pleasant 71 years old male with small cell lung cancer and brain 

metastases and possible hydrocephalus.  Oncology team recommended neurosurgical 

evaluation which can be done as an outpatient.  I discussed with staff also 

patient wife does not want him to be transferred but rather follow-up as an o

utpatient in the office.  The meantime continue with dexamethasone.  Consult 

nephrology service


Pulmonary service on the case for pneumonia however suspicion is low.  Patient 

is currently on broad-spectrum antibiotic, namely gram-negative and atypical 

bacteria.


Labs and medication were reviewed..  Continue same treatment.  Continue with 

symptomatic treatment.  Resume home medication.  Monitor lytes and vitals.  DVT 

and GI prophylaxis.  Further recommendations as per clinical course of the 

patient


DVT prophylaxis: Subcutaneous heparin


GI Prophylaxis: Pepcid


PT/OT: Pending


Prognosis is guarded

## 2021-05-18 NOTE — P.CONS
History of Present Illness





- Reason for Consult


Consult date: 21


metastatic cancer


Requesting physician: Jose E Sheet





- Chief Complaint


Mental status changes and weakness





- History of Present Illness








Nikolai was initially being evaluated after diagnosis of squamous cell cancer of 

Larynx. He presented with dysphona and feeling of a "lump: in L cervical area. 

He had CT scan of neck and referred to Dr Fuentes, biopsy of left subglo

tic mass, as well as, L vocal cord revealed well differentiated squamous cell 

carcinoma.


He was evaluated by Dr Perez , PET Scan performed at [facility] revealing 

increased uptake at at L vocal cord, but not subglotic mass.


He denied anorexia or weight loss, smoked 1 PPD X 30 years, quit smoking 20 

years ago, he consumes 3-4 beers daily, he worked in a [employer] factory.


20: Had total laryngectomy with bilateral neck disections on 20 (Dr Perez) at [facility] : T3 (2.7X1.3X0.7cm Ca) found, margins negative, all LN 

negative (67) .


He was given adjuvant Radtiaon therapy (Dr iVllarreal).


When seen today, he is C/O difficulty clearing throught with thick mucus, as 

well as, bilateral edema in submandibular area.


20: Had PET Scan : new R hilar mass with mediastinal lymphadenopathy, 

suspected liver & bone mets > had diagnostic bronchoscopy by Dr Lieberman on 

20 revealing small cell carcinoma !!.


He is C/O fatigue & SOB, no hemoptysis.


20: Status post cycle one carbo/ [id number].


20: Tolerated cycle # 1 of Carboplatinum/Etoposide/Tecentriq Chemotherapy 

very well > very mild nausea X 1 > developing alopecia


20: Feels well, tolerating Chemotherapy well (minimal grade I nausea), 

mild SOB


20: PET Scan 20 with response to treatment evenced by marked in

terval improvement in hilar and thoracic adenopathy, as well as, no areas of 

abnormal hypermetabolic uptake. Interval progression of osseous disease without 

abnormal uptake. He will continue on immune therapy. Couple bouts of diarrhea 

after fast food but improved.


21: Feels well, tolerating Tecentriq well, last PET Scan: complete 

metabolic response


3/26/21: Feels well, C/O R sided headaches X 3 weeks, no visual symptoms or loss

of balance





He had his last tecentriq in April, was seen in hospital with concern of 

progressive brain mets versus bleed. review and team discussion determined out

patient neurosurgery followup, he was seen in office by myself last week, still 

not feeling great and per wife still forgetfull and periods of confusion. 








Patients wife stated that they stopped his dexamethasone on Saturday, 4mg was 

dose, and by Monday his legs just "gave out". He was scheduled to see neurology 

and neurosurgery thursday and she would like to keep that appointment however 

the patient is still very weak and its unknown if this will be safe. 





Review of Systems


ROS unobtainable: due to mental status





Past Medical History


Past Medical History: Cancer, Hypertension, Thyroid Disorder


Additional Past Medical History / Comment(s): Pt recently admitted to [facility] with 

recurrent falls/R leg weakness 2ndary to brain stem lesions/multiple brain 

lesions, hyponatremia. 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 

with laryngectomy/thyroidectomy/neck/throat and nasal reconstruction, 2020 

small cell R lung cancer with chemo, 3/29/21 lung to brain mets/10 radiation 

treatments/now has falls/increased R leg weakness, past skin cancer with 

removals


History of Any Multi-Drug Resistant Organisms: None Reported


Additional Past Surgical History / Comment(s): [facility]: total 

laryngectomy/thyroidectomy, throat/neck/nasal resection with 

tracheostomy/prosthetic speaking valve, 20 brochoscopy with BAL/brushings/b

iopsies, colonoscopy.


Past Anesthesia/Blood Transfusion Reactions: No Reported Reaction


Past Psychological History: No Psychological Hx Reported


Additional Psychological History / Comment(s): CLAUSTROPHOBIA.  Pt resides with 

his spouse.  He has a borrowed walker which is too wide for him and a cane but 

does not use them.  He no longer drives, his spouse takes him to [facility]. Pt is a 

 and receiving home care, PT/OT and nurse thru [facility].  Pt has a 

cane and a walker.


Smoking Status: Former smoker


Past Alcohol Use History: Daily


Additional Past Alcohol Use History / Comment(s): Pt started smoking in  and

quit in .  He was a heavy drinker-6 beers/day but recently quit all 

together.


Past Drug Use History: None Reported





- Past Family History


  ** Father


Family Medical History: Myocardial Infarction (MI)


Additional Family Medical History / Comment(s): Father  of a MI at the age 

of 58yrs.





  ** Mother


Family Medical History: CVA/TIA


Additional Family Medical History / Comment(s): Mother is alive and is 96yrs 

old.





Medications and Allergies


                                Home Medications











 Medication  Instructions  Recorded  Confirmed  Type


 


Levothyroxine Sodium 150 mcg PO DAILY 21 History


 


Acetaminophen Tab [Tylenol] 650 mg PO Q6HR PRN  tab 21 Rx


 


Pantoprazole [Protonix] 40 mg PO AC-BID 14 Days #28 21 Rx





 tablet.   


 


Atenolol [Tenormin] 50 mg PO DAILY 05/10/21 05/17/21 History


 


Nystatin 100,000 Unit/ml Susp 500,000 unit PO QID 05/10/21 05/17/21 History





[Mycostatin Oral Susp]    


 


Aspirin 81 mg PO DAILY 30 Days #30 chew 21 Rx


 


Folic Acid 1 mg PO DAILY@1200 30 Days #30 tab 21 Rx


 


Insulin Detemir (Levemir) [Levemir] 15 unit SQ HS 30 Days #4 syr 21 Rx


 


Thiamine [Vitamin B-1] 100 mg PO DAILY@1200 30 Days #30 21 Rx





 tab   


 


Atorvastatin [Lipitor] 40 mg PO HS 21 History


 


Multivitamins, Thera [Multivitamin 1 tab PO HS 21 History





(formulary)]    








                                    Allergies











Allergy/AdvReac Type Severity Reaction Status Date / Time


 


No Known Allergies Allergy   Verified 21 18:14














Physical Exam


Vitals: 


                                   Vital Signs











  Temp Pulse Pulse Resp BP BP Pulse Ox


 


 21 14:00  98.1 F   73  17   112/76  93 L


 


 21 08:00  98.4 F   85  16   111/75  92 L


 


 21 05:42  98.8 F      


 


 21 00:55  97.6 F   82  20   119/83  90 L


 


 21 19:59   92   18  120/84   92 L


 


 21 17:16  99.5 F  70   18  126/82   98








                                Intake and Output











 21





 22:59 06:59 14:59


 


Other:   


 


  Voiding Method   Incontinent





   External Catheter


 


  # Voids  1 


 


  Weight 77.111 kg 77.111 kg 














- Constitutional


General appearance: cooperative





- EENT


Eyes: poor dentition


ENT: hard of hearing





- Neck


Neck: normal ROM





- Respiratory


Respiratory: bilateral: diminished





- Cardiovascular


Rhythm: regular





- Gastrointestinal


General gastrointestinal: soft





- Integumentary


Integumentary: pale





- Neurologic





inconsistent following





- Musculoskeletal


Musculoskeletal: generalized weakness





Results


CBC & Chem 7: 


                                 21 05:51





                                 21 05:51


Labs: 


                  Abnormal Lab Results - Last 24 Hours (Table)











  21 Range/Units





  18:15 18:15 18:15 


 


WBC     (3.8-10.6)  k/uL


 


Hct  38.9 L    (39.0-53.0)  %


 


RDW  16.2 H    (11.5-15.5)  %


 


Plt Count  122 L    (150-450)  k/uL


 


Lymphocytes #  0.5 L    (1.0-4.8)  k/uL


 


APTT   19.3 L   (22.0-30.0)  sec


 


Sodium    135 L  (137-145)  mmol/L


 


Glucose    157 H  (74-99)  mg/dL


 


POC Glucose (mg/dL)     (75-99)  mg/dL


 


Total Protein    5.3 L  (6.3-8.2)  g/dL


 


Albumin    2.9 L  (3.5-5.0)  g/dL


 


Urine Protein     (Negative)  


 


Urine Mucus     (None)  /hpf














  21 Range/Units





  21:34 00:16 05:51 


 


WBC    3.3 L  (3.8-10.6)  k/uL


 


Hct     (39.0-53.0)  %


 


RDW    16.4 H  (11.5-15.5)  %


 


Plt Count    113 L  (150-450)  k/uL


 


Lymphocytes #    0.7 L  (1.0-4.8)  k/uL


 


APTT     (22.0-30.0)  sec


 


Sodium     (137-145)  mmol/L


 


Glucose     (74-99)  mg/dL


 


POC Glucose (mg/dL)  124 H  148 H   (75-99)  mg/dL


 


Total Protein     (6.3-8.2)  g/dL


 


Albumin     (3.5-5.0)  g/dL


 


Urine Protein     (Negative)  


 


Urine Mucus     (None)  /hpf














  21 Range/Units





  05:51 09:00 11:41 


 


WBC     (3.8-10.6)  k/uL


 


Hct     (39.0-53.0)  %


 


RDW     (11.5-15.5)  %


 


Plt Count     (150-450)  k/uL


 


Lymphocytes #     (1.0-4.8)  k/uL


 


APTT     (22.0-30.0)  sec


 


Sodium  133 L    (137-145)  mmol/L


 


Glucose  103 H    (74-99)  mg/dL


 


POC Glucose (mg/dL)    128 H  (75-99)  mg/dL


 


Total Protein     (6.3-8.2)  g/dL


 


Albumin     (3.5-5.0)  g/dL


 


Urine Protein   1+ H   (Negative)  


 


Urine Mucus   Occasional H   (None)  /hpf











CT Scan - head: report reviewed





Assessment and Plan


Plan: 





CT Scan - head: report reviewed, image reviewed


MRI - head: report reviewed, image reviewed





Assessment and Plan


Assessment: 


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


 =  status post total laryngectomy and bilateral lymph node dissection with 

final pathology revealing a stage III (pT3, pN0, M0) 


 - squamous cell carcinoma the larynx with subglottic extension and 

lymphovascular space invasion.  


 = He underwent a course of adjuvant radiation finishing on May 21, 2020.  


 = Shortly after his treatment, he was unfortunately diagnosed with extensive 

small cell lung cancer of the right upper lung.  


 = He has undergone chemoimmunotherapy with excellent response.  


Recently presented with with brain metastases and underwent WBRT finishing on 

2021.  


 - Recently hospitalized due to weakness/falls. 


 - Now Re-hospitalized for the same





Plan: 


Brain metastases:  


 - As detailed above, the patient's metastatic disease has responded quite well 

to radiotherapy.  One lesion along the left caudate seemed to enlarge, but this 

is likely based on hemorrhagic conversion as opposed to disease progression. 


Hydrocephalus:   


 - The patient does appear to have hydrocephalus, however I did discuss his case

with radiology.  They feel that none of the lesions appear to be obstructing 

outflow.  This raises the possibility of NPH.  Although the patient is not 

having all of the typical symptoms of hydrocephalus, this certainly could be 

contributing to his gait disturbance.  Neurosurgical outpatient evaluation was 

plan, however [facility], and this was 

made for today, but patient has now presented with repeated falls and clinically

worsening 


 - If possible to be transferred for evaluation this is first recommendation, 

however the [facility] does present with additional barrier. 


 - Recommend Neurosurg evaluation asap.


Restart Dexamethasone at 4mg, if improved strength and can safely be discharge 

can go to outpatient appointment





Physician Attest: I have completed the full history and physical and agree with 

above dictation, dictated as a scribe.

## 2021-05-18 NOTE — P.CNPUL
History of Present Illness


Consult date: 21


Requesting physician: Tim Macdonald


Reason for consult: dyspnea, COPD, lung mass, abnormal CXR/CT


Chief complaint: Shortness of breath.


History of present illness: 





Pulmonary consult dated 2021.





71-year-old male, who presents to the emergency department on May 17, with 

weakness, and shortness of breath.  Apparently the patient was unable to give 

history in part because he has a tracheostomy tube in place.  The patient has a 

history of small cell lung cancer.  He was initially diagnosed by my partner in 

2020.  He has small cell lung cancer.  It is metastatic to the brain.  He

apparently had been on steroids recently been completed and the day before his 

admission in the emergency department.  He does follow-up with Dr. Ricardo Villarreal 

in radiation oncology.  The patient was in his room.  He was not on any 

supplemental oxygen.  Tracheostomy tube in place.  His room air saturations are 

97%.  He was not having any respiratory distress whatsoever.  He really could 

not give me much of the history other than to say that he was weak and a bit 

short of breath.  In addition to lung cancer, he has a history of hypertension, 

hypothyroidism, lower extremity weakness, hyponatremia, history of laryngeal and

glottic squamous cell cancer, with laryngectomy thyroidectomy and neck randall

nstruction surgery in .  More recently in 2020 was diagnosed with 

small cell lung cancer, status post chemotherapy, and radiation therapy to the 

brain.  He also has a history of skin cancer.  White count 3.3, hemoglobin 13.7,

hematocrit 39.4, and platelet count 113,000.  Sodium 133, potassium 3.6, 

chlorides 104, CO2 25, anion gap 4, BUN and creatinine were 17 and 0.78.  Chest 

x-ray shows patchy bilateral airspace disease which might be consistent with 

pneumonia.





Review of Systems





REVIEW OF SYSTEMS:  


CONSTITUTIONAL: Weakness.


NEUROLOGIC: [ Negative.]


HEENT:  [ Negative.]


CARDIAC:  [Negative.]


PULMONARY: Mild shortness of breath.


GI:  [Negative.]


:  [Negative.]


RHEUMATOLOGIC: [ Negative.]


IMMUNOLOGIC: [ Negative.]


ENDOCRINE:  [Negative.  ] 


DERMATOLOGIC: [Negative.]








Past Medical History


Past Medical History: Cancer, Hypertension, Thyroid Disorder


Additional Past Medical History / Comment(s): Pt recently admitted to [facility] with 

recurrent falls/R leg weakness 2ndary to brain stem lesions/multiple brain 

lesions, hyponatremia. 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 

with laryngectomy/thyroidectomy/neck/throat and nasal reconstruction, 2020 

small cell R lung cancer with chemo, 3/29/21 lung to brain mets/10 radiation 

treatments/now has falls/increased R leg weakness, past skin cancer with 

removals


History of Any Multi-Drug Resistant Organisms: None Reported


Additional Past Surgical History / Comment(s): [facility]: total 

laryngectomy/thyroidectomy, throat/neck/nasal resection with 

tracheostomy/prosthetic speaking valve, 20 brochoscopy with 

BAL/brushings/biopsies, colonoscopy.


Past Anesthesia/Blood Transfusion Reactions: No Reported Reaction


Past Psychological History: No Psychological Hx Reported


Additional Psychological History / Comment(s): CLAUSTROPHOBIA.  Pt resides with 

his spouse.  He has a borrowed walker which is too wide for him and a cane but 

does not use them.  He no longer drives, his spouse takes him to app. Pt is a 

 and receiving home care, PT/OT and nurse thru [facility].  Pt has a 

cane and a walker.


Smoking Status: Former smoker


Past Alcohol Use History: Daily


Additional Past Alcohol Use History / Comment(s): Pt started smoking in  and

 quit in .  He was a heavy drinker-6 beers/day but recently quit all 

together.


Past Drug Use History: None Reported





- Past Family History


  ** Father


Family Medical History: Myocardial Infarction (MI)


Additional Family Medical History / Comment(s): Father  of a MI at the age 

of 58yrs.





  ** Mother


Family Medical History: CVA/TIA


Additional Family Medical History / Comment(s): Mother is alive and is 96yrs o

ld.





Medications and Allergies


                                Home Medications











 Medication  Instructions  Recorded  Confirmed  Type


 


Levothyroxine Sodium 150 mcg PO DAILY 21 History


 


Acetaminophen Tab [Tylenol] 650 mg PO Q6HR PRN  tab 21 Rx


 


Pantoprazole [Protonix] 40 mg PO AC-BID 14 Days #28 21 Rx





 tablet.   


 


Atenolol [Tenormin] 50 mg PO DAILY 05/10/21 05/17/21 History


 


Nystatin 100,000 Unit/ml Susp 500,000 unit PO QID 05/10/21 05/17/21 History





[Mycostatin Oral Susp]    


 


Aspirin 81 mg PO DAILY 30 Days #30 chew 21 Rx


 


Folic Acid 1 mg PO DAILY@1200 30 Days #30 tab 21 Rx


 


Insulin Detemir (Levemir) [Levemir] 15 unit SQ HS 30 Days #4 syr 21 Rx


 


Thiamine [Vitamin B-1] 100 mg PO DAILY@1200 30 Days #30 21 Rx





 tab   


 


Atorvastatin [Lipitor] 40 mg PO HS 21 History


 


Multivitamins, Thera [Multivitamin 1 tab PO HS 21 History





(formulary)]    








                                    Allergies











Allergy/AdvReac Type Severity Reaction Status Date / Time


 


No Known Allergies Allergy   Verified 21 18:14














Physical Exam


Osteopathic Statement: *.  No significant issues noted on an osteopathic 

structural exam other than those noted in the History and Physical/Consult.


Vitals: 


                                   Vital Signs











  Temp Pulse Pulse Resp BP BP Pulse Ox


 


 21 08:00  98.4 F   85  16   111/75  92 L


 


 21 05:42  98.8 F      


 


 21 00:55  97.6 F   82  20   119/83  90 L


 


 21 19:59   92   18  120/84   92 L


 


 21 17:16  99.5 F  70   18  126/82   98








                                Intake and Output











 21





 22:59 06:59 14:59


 


Other:   


 


  Voiding Method   Incontinent





   External Catheter


 


  # Voids  1 


 


  Weight 77.111 kg 77.111 kg 














No acute distress, oriented 3.  Unable to give history because of his previous 

tracheotomy and laryngectomy.





HEENT examination is grossly unremarkable.  





Neck supple.  Full range of motion.  No adenopathy thyromegaly or neck vein 

distention.  Midline tracheotomy is noted.





Cardiovascular examination reveals regular rhythm rate.  S1-S2 normal.  No S3 or

S4.  No discernible murmur noted.  Heart rate 85 bpm.





Lungs reveal mostly clear breath sounds.  Scattered rhonchi are noted.  No 

wheezes or crackles.  Breath sounds equal bilaterally.





Abdomen soft bowel sounds are heard.  No masses or tenderness.





Extremities are intact.  No cyanosis clubbing or edema.





Skin is without rash or lesion.





Neurologic examination is brief but nonfocal.





Results





- Laboratory Findings


CBC and BMP: 


                                 21 05:51





                                 21 05:51


PT/INR, D-dimer











PT  10.3 sec (9.0-12.0)   21  18:15    


 


INR  1.0  (<1.2)   21  18:15    








Abnormal lab findings: 


                                  Abnormal Labs











  21





  18:15 18:15 18:15


 


WBC   


 


Hct  38.9 L  


 


RDW  16.2 H  


 


Plt Count  122 L  


 


Lymphocytes #  0.5 L  


 


APTT   19.3 L 


 


Sodium    135 L


 


Glucose    157 H


 


POC Glucose (mg/dL)   


 


Total Protein    5.3 L


 


Albumin    2.9 L


 


Urine Protein   


 


Urine Mucus   














  21





  21:34 00:16 05:51


 


WBC    3.3 L


 


Hct   


 


RDW    16.4 H


 


Plt Count    113 L


 


Lymphocytes #    0.7 L


 


APTT   


 


Sodium   


 


Glucose   


 


POC Glucose (mg/dL)  124 H  148 H 


 


Total Protein   


 


Albumin   


 


Urine Protein   


 


Urine Mucus   














  21





  05:51 09:00 11:41


 


WBC   


 


Hct   


 


RDW   


 


Plt Count   


 


Lymphocytes #   


 


APTT   


 


Sodium  133 L  


 


Glucose  103 H  


 


POC Glucose (mg/dL)    128 H


 


Total Protein   


 


Albumin   


 


Urine Protein   1+ H 


 


Urine Mucus   Occasional H 














- Diagnostic Findings


Chest x-ray: image reviewed





Assessment and Plan


Assessment: 





Probable bilateral community-acquired pneumonia.





Weakness, likely secondary to above.





History of small cell lung cancer, with metastases to the brain, status post 

chemotherapy and radiation therapy, diagnosed back in 2020.





History of squamous cell carcinoma of the head and neck area, with previous 

laryngectomy and tracheotomy.





History of surgical hypothyroidism.





Hyperlipidemia.





History of hypertension.





History of diabetes mellitus.





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





History of skin cancer.





Prior history of tobacco use.








Plan: 





Plan dated 2021.





Currently, the patient's on Zithromax and Rocephin.  He's not having any issues 

with his breathing.  His room air saturations 97%.  Follow make recommendations 

were appropriate.  The patient has received chemotherapy and radiation therapy 

for his metastatic small cell lung cancer.  Additional recommendations and 

suggestions are forthcoming.  Prognosis is guarded.


Time with Patient: Greater than 30

## 2021-05-19 VITALS — RESPIRATION RATE: 18 BRPM

## 2021-05-19 VITALS — DIASTOLIC BLOOD PRESSURE: 86 MMHG | TEMPERATURE: 98.1 F | HEART RATE: 65 BPM | SYSTOLIC BLOOD PRESSURE: 123 MMHG

## 2021-05-19 LAB
ALBUMIN SERPL-MCNC: 3.1 G/DL (ref 3.8–4.9)
ALBUMIN/GLOB SERPL: 1.72 G/DL (ref 1.6–3.17)
ALP SERPL-CCNC: 77 U/L (ref 41–126)
ALT SERPL-CCNC: 45 U/L (ref 10–49)
ANION GAP SERPL CALC-SCNC: 9 MMOL/L (ref 4–12)
AST SERPL-CCNC: 26 U/L (ref 14–35)
BASOPHILS # BLD MANUAL: 0.03 X 10*3/UL (ref 0–0.1)
BUN SERPL-SCNC: 16 MG/DL (ref 9–27)
BUN/CREAT SERPL: 20 RATIO (ref 12–20)
CALCIUM SPEC-MCNC: 8.4 MG/DL (ref 8.7–10.3)
CHLORIDE SERPL-SCNC: 105 MMOL/L (ref 96–109)
CO2 SERPL-SCNC: 26 MMOL/L (ref 21.6–31.8)
EOSINOPHIL # BLD MANUAL: 0 X 10*3/UL (ref 0.04–0.35)
ERYTHROCYTE [DISTWIDTH] IN BLOOD BY AUTOMATED COUNT: 4.34 X 10*6/UL (ref 4.4–5.6)
ERYTHROCYTE [DISTWIDTH] IN BLOOD: 16.9 % (ref 11.5–14.5)
GLOBULIN SER CALC-MCNC: 1.8 G/DL (ref 1.6–3.3)
GLUCOSE BLD-MCNC: 136 MG/DL (ref 75–99)
GLUCOSE BLD-MCNC: 239 MG/DL (ref 75–99)
GLUCOSE BLD-MCNC: 314 MG/DL (ref 75–99)
GLUCOSE BLD-MCNC: 345 MG/DL (ref 75–99)
GLUCOSE SERPL-MCNC: 139 MG/DL (ref 70–110)
HCT VFR BLD AUTO: 38.3 % (ref 39.6–50)
HGB BLD-MCNC: 12.5 G/DL (ref 13–17)
LYMPHOCYTES # BLD MANUAL: 0.31 X 10*3/UL (ref 0.9–5)
MCH RBC QN AUTO: 28.8 PG (ref 27–32)
MCHC RBC AUTO-ENTMCNC: 32.6 G/DL (ref 32–37)
MCV RBC AUTO: 88.2 FL (ref 80–97)
MONOCYTES # BLD MANUAL: 0.09 X 10*3/UL (ref 0.2–1)
NEUTROPHILS NFR BLD MANUAL: 81 %
NEUTS SEG # BLD MANUAL: 2.48 X 10*3/UL (ref 2–8.9)
PLATELET # BLD AUTO: 118 X 10*3/UL (ref 140–440)
POTASSIUM SERPL-SCNC: 5.1 MMOL/L (ref 3.5–5.5)
PROT SERPL-MCNC: 4.9 G/DL (ref 6.2–8.2)
SODIUM SERPL-SCNC: 140 MMOL/L (ref 135–145)
WBC # BLD AUTO: 3.06 X 10*3/UL (ref 4.5–10)

## 2021-05-19 RX ADMIN — INSULIN ASPART SCH UNIT: 100 INJECTION, SOLUTION INTRAVENOUS; SUBCUTANEOUS at 20:51

## 2021-05-19 RX ADMIN — LEVOTHYROXINE SODIUM SCH MCG: 75 TABLET ORAL at 05:29

## 2021-05-19 RX ADMIN — NYSTATIN SCH UNIT: 100000 SUSPENSION ORAL at 07:30

## 2021-05-19 RX ADMIN — PANTOPRAZOLE SODIUM SCH MG: 40 TABLET, DELAYED RELEASE ORAL at 07:30

## 2021-05-19 RX ADMIN — INSULIN ASPART SCH UNIT: 100 INJECTION, SOLUTION INTRAVENOUS; SUBCUTANEOUS at 17:52

## 2021-05-19 RX ADMIN — INSULIN DETEMIR SCH UNIT: 100 INJECTION, SOLUTION SUBCUTANEOUS at 20:51

## 2021-05-19 RX ADMIN — PANTOPRAZOLE SODIUM SCH MG: 40 TABLET, DELAYED RELEASE ORAL at 17:52

## 2021-05-19 RX ADMIN — NYSTATIN SCH UNIT: 100000 SUSPENSION ORAL at 12:18

## 2021-05-19 RX ADMIN — THERA TABS SCH EACH: TAB at 20:50

## 2021-05-19 RX ADMIN — NYSTATIN SCH UNIT: 100000 SUSPENSION ORAL at 17:52

## 2021-05-19 RX ADMIN — INSULIN ASPART SCH UNIT: 100 INJECTION, SOLUTION INTRAVENOUS; SUBCUTANEOUS at 12:18

## 2021-05-19 RX ADMIN — NYSTATIN SCH UNIT: 100000 SUSPENSION ORAL at 20:50

## 2021-05-19 RX ADMIN — INSULIN ASPART SCH UNIT: 100 INJECTION, SOLUTION INTRAVENOUS; SUBCUTANEOUS at 07:31

## 2021-05-19 RX ADMIN — AZITHROMYCIN SCH MG: 500 TABLET, FILM COATED ORAL at 07:30

## 2021-05-19 RX ADMIN — ATORVASTATIN CALCIUM SCH MG: 40 TABLET, FILM COATED ORAL at 20:51

## 2021-05-19 RX ADMIN — Medication SCH MG: at 07:30

## 2021-05-19 RX ADMIN — FOLIC ACID SCH MG: 1 TABLET ORAL at 07:31

## 2021-05-19 NOTE — P.PN
Subjective


Progress Note Date: 05/19/21


Principal diagnosis: 





Metastatic Cancer





WOrsening neurological symptoms





Objective





- Vital Signs


Vital signs: 


                                   Vital Signs











Temp  97.5 F L  05/19/21 07:18


 


Pulse  74   05/19/21 07:18


 


Resp  18   05/19/21 07:18


 


BP  103/75   05/19/21 07:18


 


Pulse Ox  90 L  05/19/21 07:18








                                 Intake & Output











 05/18/21 05/19/21 05/19/21





 18:59 06:59 18:59


 


Intake Total 540  


 


Balance 540  


 


Intake:   


 


  Oral 540  


 


Other:   


 


  Voiding Method Incontinent  Incontinent


 


  # Voids 3 3 














- Exam





- Constitutional


General appearance: cooperative





- EENT


Eyes: poor dentition


ENT: hard of hearing





- Neck


Neck: normal ROM





- Respiratory


Respiratory: bilateral: diminished





- Cardiovascular


Rhythm: regular





- Gastrointestinal


General gastrointestinal: soft





- Integumentary


Integumentary: pale





- Neurologic





inconsistent following





- Musculoskeletal


Musculoskeletal: generalized weakness





- Labs


CBC & Chem 7: 


                                 05/19/21 06:10





                                 05/19/21 06:10


Labs: 


                  Abnormal Lab Results - Last 24 Hours (Table)











  05/18/21 05/18/21 05/18/21 Range/Units





  05:51 05:51 16:50 


 


WBC     (4.50-10.00)  X 10*3/uL


 


RBC     (4.40-5.60)  X 10*6/uL


 


Hgb     (13.0-17.0)  g/dL


 


Hct     (39.6-50.0)  %


 


RDW     (11.5-14.5)  %


 


Plt Count     (140-440)  X 10*3/uL


 


Plt Count Comment     


 


Metamyelocytes %     (0-0)  %


 


Myelocytes %     (0-0)  %


 


Lymphocytes # (Manual)     (0.90-5.00)  X 10*3/uL


 


Monocytes # (Manual)     (0.20-1.00)  X 10*3/uL


 


Eosinophils # (Manual)     (0.04-0.35)  X 10*3/uL


 


Glucose     ()  mg/dL


 


POC Glucose (mg/dL)    202 H  (75-99)  mg/dL


 


Calcium     (8.7-10.3)  mg/dL


 


Total Protein     (6.2-8.2)  g/dL


 


Albumin     (3.80-4.90)  g/dL


 


Procalcitonin  0.37 H    (0.02-0.09)  ng/mL


 


TSH   6.350 H   (0.350-5.500)  uIU/mL














  05/18/21 05/19/21 05/19/21 Range/Units





  20:02 06:10 06:10 


 


WBC   3.06 L   (4.50-10.00)  X 10*3/uL


 


RBC   4.34 L   (4.40-5.60)  X 10*6/uL


 


Hgb   12.5 L   (13.0-17.0)  g/dL


 


Hct   38.3 L   (39.6-50.0)  %


 


RDW   16.9 H   (11.5-14.5)  %


 


Plt Count   118 L   (140-440)  X 10*3/uL


 


Plt Count Comment   DECREASED A   


 


Metamyelocytes %   4 H   (0-0)  %


 


Myelocytes %   1 H   (0-0)  %


 


Lymphocytes # (Manual)   0.31 L   (0.90-5.00)  X 10*3/uL


 


Monocytes # (Manual)   0.09 L   (0.20-1.00)  X 10*3/uL


 


Eosinophils # (Manual)   0 L   (0.04-0.35)  X 10*3/uL


 


Glucose    139 H  ()  mg/dL


 


POC Glucose (mg/dL)  239 H    (75-99)  mg/dL


 


Calcium    8.4 L  (8.7-10.3)  mg/dL


 


Total Protein    4.9 L  (6.2-8.2)  g/dL


 


Albumin    3.10 L  (3.80-4.90)  g/dL


 


Procalcitonin     (0.02-0.09)  ng/mL


 


TSH     (0.350-5.500)  uIU/mL














  05/19/21 05/19/21 Range/Units





  06:48 11:36 


 


WBC    (4.50-10.00)  X 10*3/uL


 


RBC    (4.40-5.60)  X 10*6/uL


 


Hgb    (13.0-17.0)  g/dL


 


Hct    (39.6-50.0)  %


 


RDW    (11.5-14.5)  %


 


Plt Count    (140-440)  X 10*3/uL


 


Plt Count Comment    


 


Metamyelocytes %    (0-0)  %


 


Myelocytes %    (0-0)  %


 


Lymphocytes # (Manual)    (0.90-5.00)  X 10*3/uL


 


Monocytes # (Manual)    (0.20-1.00)  X 10*3/uL


 


Eosinophils # (Manual)    (0.04-0.35)  X 10*3/uL


 


Glucose    ()  mg/dL


 


POC Glucose (mg/dL)  136 H  314 H  (75-99)  mg/dL


 


Calcium    (8.7-10.3)  mg/dL


 


Total Protein    (6.2-8.2)  g/dL


 


Albumin    (3.80-4.90)  g/dL


 


Procalcitonin    (0.02-0.09)  ng/mL


 


TSH    (0.350-5.500)  uIU/mL








                      Microbiology - Last 24 Hours (Table)











 05/18/21 14:30 Gram Stain - Preliminary





 Sputum Sputum Culture - Preliminary














Assessment and Plan


Plan: 





CT Scan - head: report reviewed, image reviewed


MRI - head: report reviewed, image reviewed





Assessment and Plan


Assessment: 


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


 =  status post total laryngectomy and bilateral lymph node dissection with 

final pathology revealing a stage III (pT3, pN0, M0) 


 - squamous cell carcinoma the larynx with subglottic extension and 

lymphovascular space invasion.  


 = He underwent a course of adjuvant radiation finishing on May 21, 2020.  


 = Shortly after his treatment, he was unfortunately diagnosed with extensive 

small cell lung cancer of the right upper lung.  


 = He has undergone chemoimmunotherapy with excellent response.  


Recently presented with with brain metastases and underwent WBRT finishing on 

4/14/2021.  


 - Recently hospitalized due to weakness/falls. 


 - Now Re-hospitalized for the same





Plan: 


Brain metastases:  


 - As detailed above, the patient's metastatic disease has responded quite well 

to radiotherapy.  One lesion along the left caudate seemed to enlarge, but this 

is likely based on hemorrhagic conversion as opposed to disease progression. 


Hydrocephalus:   


 - The patient does appear to have hydrocephalus, however I did discuss his case

with radiology.  They feel that none of the lesions appear to be obstructing 

outflow.  This raises the possibility of NPH.  Although the patient is not 

having all of the typical symptoms of hydrocephalus, this certainly could be 

contributing to his gait disturbance.  Neurosurgical outpatient evaluation was 

plan, however the [facility], and this was 

made for today, but patient has now presented with repeated falls and clinically

worsening 


 - If possible to be transferred for evaluation this is first recommendation, 

however the [facility] does present with additional barrier. 


 - Recommend Neurosurg evaluation asap.


Continue Dexamethasone at 4mg, 


Plan to transfer to tertiary care for neurosurgery evaluation





Physician Attest: I have completed the full history and physical and agree with 

above dictation, dictated as a scribe.

## 2021-05-19 NOTE — P.PN
Subjective


Progress Note Date: 05/19/21


Principal diagnosis: 


 COPD, lung mass, abnormal chest x-ray





71-year-old male, who presents to the emergency department on May 17, with 

weakness, and shortness of breath.  Apparently the patient was unable to give 

history in part because he has a tracheostomy tube in place.  The patient has a 

history of small cell lung cancer.  He was initially diagnosed by my partner in 

August 2020.  He has small cell lung cancer.  It is metastatic to the brain.  He

apparently had been on steroids recently been completed and the day before his 

admission in the emergency department.  He does follow-up with Dr. Ricardo Villarreal 

in radiation oncology.  The patient was in his room.  He was not on any 

supplemental oxygen.  Tracheostomy tube in place.  His room air saturations are 

97%.  He was not having any respiratory distress whatsoever.  He really could 

not give me much of the history other than to say that he was weak and a bit 

short of breath.  In addition to lung cancer, he has a history of hypertension, 

hypothyroidism, lower extremity weakness, hyponatremia, history of laryngeal and

glottic squamous cell cancer, with laryngectomy thyroidectomy and neck 

reconstruction surgery in 2020.  More recently in August 2020 was diagnosed with

small cell lung cancer, status post chemotherapy, and radiation therapy to the 

brain.  He also has a history of skin cancer.  White count 3.3, hemoglobin 13.7,

hematocrit 39.4, and platelet count 113,000.  Sodium 133, potassium 3.6, 

chlorides 104, CO2 25, anion gap 4, BUN and creatinine were 17 and 0.78.  Chest 

x-ray shows patchy bilateral airspace disease which might be consistent with 

pneumonia.





On 05/19/2021 patient seen in follow-up on medical surgical floor, he is resting

in bed quietly, he is currently on room air, with pulse ox between 90-94%.  He 

remains on antibiotics in the form of azithromycin and Rocephin for pneumonia.  

Seems to be breathing fairly comfortably, no significant cough or phlegm 

production, no hemoptysis, today's labs have been reviewed, his white blood cell

count is 3.06, hemoglobin is 12.5, platelet count is still pending, electrolytes

and renal profile are within normal limits.  Medical oncology is following, 

patient has been started on Decadron 4 mg daily for brain metastasis.  There is 

a possible transfer to a tertiary care facility with neurosurgery coverage, and 

this is being initiated by medical oncology.  Otherwise no acute events 

overnight.  Patient is very weak, he has been unable to ambulate, neurosurgical 

consultation is being recommended by medical oncology at this time











Objective





- Vital Signs


Vital signs: 


                                   Vital Signs











Temp  97.5 F L  05/19/21 07:18


 


Pulse  74   05/19/21 07:18


 


Resp  18   05/19/21 07:18


 


BP  103/75   05/19/21 07:18


 


Pulse Ox  90 L  05/19/21 07:18








                                 Intake & Output











 05/18/21 05/19/21 05/19/21





 18:59 06:59 18:59


 


Intake Total 540  


 


Balance 540  


 


Intake:   


 


  Oral 540  


 


Other:   


 


  Voiding Method Incontinent  Incontinent


 


  # Voids 3 3 














- Exam


 GENERAL EXAM: Alert, very weak, 70-year-old white male, on room air with a 

pulse ox of 90-94%, comfortable in no apparent distress.


HEAD: Normocephalic/atraumatic.


EYES: Normal reaction of pupils, equal size.  Conjunctiva pink, sclera white.


NOSE: Clear with pink turbinates.


THROAT: No erythema or exudates.


NECK: No masses, no JVD, no thyroid enlargement, no adenopathy.  Midline 

tracheostomy


CHEST: No chest wall deformity.  Symmetrical expansion. 


LUNGS: Equal air entry with a few bilateral crackles


CVS: Regular rate and rhythm, normal S1 and S2, no gallops, no murmurs, no rubs


ABDOMEN: Soft, nontender.  No hepatosplenomegaly, normal bowel sounds, no 

guarding or rigidity.


EXTREMITIES: No clubbing, no edema, no cyanosis, 2+ pulses and upper and lower 

extremities.


MUSCULOSKELETAL: Muscle strength and tone normal.


SPINE: No scoliosis or deformity


SKIN: No rashes


CENTRAL NERVOUS SYSTEM: Alert and oriented -3.  No focal deficits, tone is 

normal in all 4 extremities.


PSYCHIATRIC: Alert and oriented -3.  Appropriate affect.  Intact judgment and 

insight.











- Labs


CBC & Chem 7: 


                                 05/19/21 06:10





                                 05/19/21 06:10


Labs: 


                  Abnormal Lab Results - Last 24 Hours (Table)











  05/18/21 05/18/21 05/18/21 Range/Units





  05:51 05:51 16:50 


 


WBC     (4.50-10.00)  X 10*3/uL


 


RBC     (4.40-5.60)  X 10*6/uL


 


Hgb     (13.0-17.0)  g/dL


 


Hct     (39.6-50.0)  %


 


RDW     (11.5-14.5)  %


 


Plt Count     (140-440)  X 10*3/uL


 


Plt Count Comment     


 


Metamyelocytes %     (0-0)  %


 


Myelocytes %     (0-0)  %


 


Lymphocytes # (Manual)     (0.90-5.00)  X 10*3/uL


 


Monocytes # (Manual)     (0.20-1.00)  X 10*3/uL


 


Eosinophils # (Manual)     (0.04-0.35)  X 10*3/uL


 


Glucose     ()  mg/dL


 


POC Glucose (mg/dL)    202 H  (75-99)  mg/dL


 


Calcium     (8.7-10.3)  mg/dL


 


Total Protein     (6.2-8.2)  g/dL


 


Albumin     (3.80-4.90)  g/dL


 


Procalcitonin  0.37 H    (0.02-0.09)  ng/mL


 


TSH   6.350 H   (0.350-5.500)  uIU/mL














  05/18/21 05/19/21 05/19/21 Range/Units





  20:02 06:10 06:10 


 


WBC   3.06 L   (4.50-10.00)  X 10*3/uL


 


RBC   4.34 L   (4.40-5.60)  X 10*6/uL


 


Hgb   12.5 L   (13.0-17.0)  g/dL


 


Hct   38.3 L   (39.6-50.0)  %


 


RDW   16.9 H   (11.5-14.5)  %


 


Plt Count   118 L   (140-440)  X 10*3/uL


 


Plt Count Comment   DECREASED A   


 


Metamyelocytes %   4 H   (0-0)  %


 


Myelocytes %   1 H   (0-0)  %


 


Lymphocytes # (Manual)   0.31 L   (0.90-5.00)  X 10*3/uL


 


Monocytes # (Manual)   0.09 L   (0.20-1.00)  X 10*3/uL


 


Eosinophils # (Manual)   0 L   (0.04-0.35)  X 10*3/uL


 


Glucose    139 H  ()  mg/dL


 


POC Glucose (mg/dL)  239 H    (75-99)  mg/dL


 


Calcium    8.4 L  (8.7-10.3)  mg/dL


 


Total Protein    4.9 L  (6.2-8.2)  g/dL


 


Albumin    3.10 L  (3.80-4.90)  g/dL


 


Procalcitonin     (0.02-0.09)  ng/mL


 


TSH     (0.350-5.500)  uIU/mL














  05/19/21 05/19/21 Range/Units





  06:48 11:36 


 


WBC    (4.50-10.00)  X 10*3/uL


 


RBC    (4.40-5.60)  X 10*6/uL


 


Hgb    (13.0-17.0)  g/dL


 


Hct    (39.6-50.0)  %


 


RDW    (11.5-14.5)  %


 


Plt Count    (140-440)  X 10*3/uL


 


Plt Count Comment    


 


Metamyelocytes %    (0-0)  %


 


Myelocytes %    (0-0)  %


 


Lymphocytes # (Manual)    (0.90-5.00)  X 10*3/uL


 


Monocytes # (Manual)    (0.20-1.00)  X 10*3/uL


 


Eosinophils # (Manual)    (0.04-0.35)  X 10*3/uL


 


Glucose    ()  mg/dL


 


POC Glucose (mg/dL)  136 H  314 H  (75-99)  mg/dL


 


Calcium    (8.7-10.3)  mg/dL


 


Total Protein    (6.2-8.2)  g/dL


 


Albumin    (3.80-4.90)  g/dL


 


Procalcitonin    (0.02-0.09)  ng/mL


 


TSH    (0.350-5.500)  uIU/mL








                      Microbiology - Last 24 Hours (Table)











 05/18/21 14:30 Gram Stain - Preliminary





 Sputum Sputum Culture - Preliminary














Assessment and Plan


Plan: 


 Assessment:





#1.  Probable bilateral community-acquired pneumonia, tested negative for COVID-

19





#2.  Weakness, inability to ambulate





#3.  History of small cell lung cancer with brain metastasis, status post 

chemotherapy and radiation therapy, and this was diagnosed back in August 2020





#4.  History of squamous cell carcinoma of the head and neck area with previous 

laryngectomy and tracheostomy nature





#5.  History of surgical hypothyroidism





#6.  Hyperlipidemia





#7.  History of hypertension





#8.  History of diabetes mellitus





#9.  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





#10.  History of skin cancer





#11.  Prior history of tobacco use





Plan:





Continue current antibiotics


His breathing seems to be stable, he remains on room air


No fever or chills


Medical oncology is recommending transfer to a tertiary care facility with 

neurosurgery coverage


The patient and the stay until tomorrow we will obtain follow-up chest x-ray 

tomorrow


We'll continue to follow





I performed a history & physical examination of the patient and discussed their 

management with my nurse practitioner, Tasha Winkler.  I reviewed the nurse 

practitioner's note and agree with the documented findings and plan of care.  

Lung sounds are positive for diminished breath sounds.  The findings and the 

impression was discussed with the patient.  I attest to the documentation by the

nurse practitioner. 





Time with Patient: Less than 30

## 2021-05-19 NOTE — P.CNNES
History of Present Illness


Consult date: 21


Requesting physician: Jose E Sheet


Reason for Consult: Brain metastasis


History of Present Illness: 





Patient is a 70-year-old male with metastatic lung cancer came to the hospital 

by ambulance on 2021 at 5:08 PM for generalized weakness.  Patient's wife 

was present, who provided most of the history.





Patient has a diagnosis of squamous cell cancer of the larynx in 2020 

for which he underwent laryngectomy, followed by 30 sessions of radiation 

treatment.  He was diagnosed with small cell lung cancer on 2020.  He 

underwent chemotherapy.  Patient developed new onset headaches after which she 

was diagnosed with metastatic lung cancer to the brain on 2021.  Patient 

received cerebral radiation therapy from 2021 to 2021.  His dose of 

corticosteroids was rapidly tapered off.  On 2021 he started falling.  He 

fell multiple times, about 8 times after which he was admitted to the hospital 

on 2021.  Neurosurgical consultation was recommended, and transfer to 

higher facility was considered, but patient was actually discharged on 

2021, as it was decided for patient to be seen by neurosurgeon as 

outpatient.  Patient was readmitted on 05/10/2021 with mental status change and 

weakness, and was felt to be related to metabolic encephalopathy from 

uncontrolled diabetes.  Patient now again admitted on 2021 because of 

severe weakness, inability to get up, tiredness.  It was felt that his weakness 

became worse because his steroids were discontinued.  Patient now has been 

restarted on Decadron, and his weakness has remarkably improved.  Patient's wife

states that on Monday, 2 days ago, he was walking with a walker, but yesterday 

on the day of admission he could not lift his legs up because felt so weak.  

Patient still has not been seen by neurosurgeon, and the family is adamant for 

patient to be seen by a neurosurgeon.  Patient's primary physician has tried for

patient to be seen through [facility], but does not have an appointment until 

2021.





CT head showed metastatic disease showing a similar appearance to prior exam.  I

reviewed patient's current and previous films.





Patient has history of smoking 1 pack per day for 30 years, quit 30 years ago.  

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





Review of Systems





As above in detail.  Patient denies headache any problem with the vision.  He 

has hypophonia because of laryngectomy.  Denies chest pain, abdominal pain.  

Denies nausea vomiting diarrhea.





Past Medical History


Past Medical History: Cancer, Hypertension, Thyroid Disorder


Additional Past Medical History / Comment(s): Pt recently admitted to [facility] with 

recurrent falls/R leg weakness 2ndary to brain stem lesions/multiple brain 

lesions, hyponatremia. 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 

with laryngectomy/thyroidectomy/neck/throat and nasal reconstruction, 2020 

small cell R lung cancer with chemo, 3/29/21 lung to brain mets/10 radiation 

treatments/now has falls/increased R leg weakness, past skin cancer with 

removals


History of Any Multi-Drug Resistant Organisms: None Reported


Additional Past Surgical History / Comment(s): [facility]: total 

laryngectomy/thyroidectomy, throat/neck/nasal resection with tracheostomy/

prosthetic speaking valve, 20 brochoscopy with BAL/brushings/biopsies, 

colonoscopy.


Past Anesthesia/Blood Transfusion Reactions: No Reported Reaction


Past Psychological History: No Psychological Hx Reported


Additional Psychological History / Comment(s): CLAUSTROPHOBIA.  Pt resides with 

his spouse.  He has a borrowed walker which is too wide for him and a cane but 

does not use them.  He no longer drives, his spouse takes him to app. Pt is a 

 and receiving home care, PT/OT and nurse thru [facility].  Pt has a 

cane and a walker.


Smoking Status: Former smoker


Past Alcohol Use History: Daily


Additional Past Alcohol Use History / Comment(s): Pt started smoking in  and

quit in .  He was a heavy drinker-6 beers/day but recently quit all 

together.


Past Drug Use History: None Reported





- Past Family History


  ** Father


Family Medical History: Myocardial Infarction (MI)


Additional Family Medical History / Comment(s): Father  of a MI at the age 

of 58yrs.





  ** Mother


Family Medical History: CVA/TIA


Additional Family Medical History / Comment(s): Mother is alive and is 96yrs 

old.





Medications and Allergies


                                Home Medications











 Medication  Instructions  Recorded  Confirmed  Type


 


Levothyroxine Sodium 150 mcg PO DAILY 21 History


 


Acetaminophen Tab [Tylenol] 650 mg PO Q6HR PRN  tab 21 Rx


 


Pantoprazole [Protonix] 40 mg PO AC-BID 14 Days #28 21 Rx





 tablet.   


 


Atenolol [Tenormin] 50 mg PO DAILY 05/10/21 05/17/21 History


 


Nystatin 100,000 Unit/ml Susp 500,000 unit PO QID 05/10/21 05/17/21 History





[Mycostatin Oral Susp]    


 


Aspirin 81 mg PO DAILY 30 Days #30 chew 21 Rx


 


Folic Acid 1 mg PO DAILY@1200 30 Days #30 tab 21 Rx


 


Insulin Detemir (Levemir) [Levemir] 15 unit SQ HS 30 Days #4 syr 21 Rx


 


Thiamine [Vitamin B-1] 100 mg PO DAILY@1200 30 Days #30 21 Rx





 tab   


 


Atorvastatin [Lipitor] 40 mg PO HS 21 History


 


Multivitamins, Thera [Multivitamin 1 tab PO HS 21 History





(formulary)]    








                                    Allergies











Allergy/AdvReac Type Severity Reaction Status Date / Time


 


No Known Allergies Allergy   Verified 21 18:14














Physical Examination





- Vital Signs


Vital Signs: 


                                   Vital Signs











  Temp Pulse Resp BP Pulse Ox


 


 21 07:18  97.5 F L  74  18  103/75  90 L


 


 21 02:13  98.5 F  71  15  111/72  94 L


 


 21 19:12  98.7 F  75  16  115/72  92 L


 


 21 14:00  98.1 F  73  17  112/76  93 L








                                Intake and Output











 21





 22:59 06:59 14:59


 


Intake Total 540  


 


Balance 540  


 


Intake:   


 


  Oral 540  


 


Other:   


 


  Voiding Method Incontinent  Incontinent


 


  # Voids 1 3 














Patient is an elderly  male, very pleasant, in no acute distress.  


Patient is alert awake oriented to time place and person.  Speech is hypophonic 

due to laryngectomy and language functions are normal.  Attention, concentration

and fund of knowledge is adequate.  Patient can name and repeat very well.





On cranial examination, pupils are round and reacting to light, visual fields 

are full on confrontation, extraocular muscles are intact with no nystagmus.  

Face is symmetric, tongue protrudes to the midline.  Palatal elevation and 

sensation normal, hearing and shoulder shrug normal, facial sensation normal.  

Shoulder shrug normal.





On muscle strength testing, there is no pronator drift and the strength is 

normal in arms and legs distally and proximally.





Deep tendon reflexes are 1 in the upper and lower limbs and plantars are 

downgoing bilaterally.





Sensory to touch is equal with no neglect.





Cerebellar function showed no ataxia for finger-to-nose testing.  No 

dysdiadochokinesia.  Tone and bulk of muscles normal.





Gait deferred.





On general examination, there is no carotid bruit or murmur, S1-S2 audible.  A

bdomen is soft nontender.  Chest is clear.  Peripheral pulses are present.  No 

edema.





Results





- Laboratory Findings


CBC and BMP: 


                                 21 06:10





                                 21 06:10


Abnormal Lab Findings: 


                                  Abnormal Labs











  21





  18:15 18:15 18:15


 


WBC   


 


RBC   


 


Hgb   


 


Hct  38.9 L  


 


RDW  16.2 H  


 


Plt Count  122 L  


 


Plt Count Comment   


 


Metamyelocytes %   


 


Myelocytes %   


 


Lymphocytes #  0.5 L  


 


Lymphocytes # (Manual)   


 


Monocytes # (Manual)   


 


Eosinophils # (Manual)   


 


APTT   19.3 L 


 


Sodium    135 L


 


Glucose    157 H


 


POC Glucose (mg/dL)   


 


Calcium   


 


Total Protein    5.3 L


 


Albumin    2.9 L


 


Procalcitonin   


 


TSH   


 


Urine Protein   


 


Urine Mucus   














  21





  21:34 00:16 05:51


 


WBC    3.3 L


 


RBC   


 


Hgb   


 


Hct   


 


RDW    16.4 H


 


Plt Count    113 L


 


Plt Count Comment   


 


Metamyelocytes %   


 


Myelocytes %   


 


Lymphocytes #    0.7 L


 


Lymphocytes # (Manual)   


 


Monocytes # (Manual)   


 


Eosinophils # (Manual)   


 


APTT   


 


Sodium   


 


Glucose   


 


POC Glucose (mg/dL)  124 H  148 H 


 


Calcium   


 


Total Protein   


 


Albumin   


 


Procalcitonin   


 


TSH   


 


Urine Protein   


 


Urine Mucus   














  21





  05:51 05:51 05:51


 


WBC   


 


RBC   


 


Hgb   


 


Hct   


 


RDW   


 


Plt Count   


 


Plt Count Comment   


 


Metamyelocytes %   


 


Myelocytes %   


 


Lymphocytes #   


 


Lymphocytes # (Manual)   


 


Monocytes # (Manual)   


 


Eosinophils # (Manual)   


 


APTT   


 


Sodium  133 L  


 


Glucose  103 H  


 


POC Glucose (mg/dL)   


 


Calcium   


 


Total Protein   


 


Albumin   


 


Procalcitonin   0.37 H 


 


TSH    6.350 H


 


Urine Protein   


 


Urine Mucus   














  21





  09:00 11:41 16:50


 


WBC   


 


RBC   


 


Hgb   


 


Hct   


 


RDW   


 


Plt Count   


 


Plt Count Comment   


 


Metamyelocytes %   


 


Myelocytes %   


 


Lymphocytes #   


 


Lymphocytes # (Manual)   


 


Monocytes # (Manual)   


 


Eosinophils # (Manual)   


 


APTT   


 


Sodium   


 


Glucose   


 


POC Glucose (mg/dL)   128 H  202 H


 


Calcium   


 


Total Protein   


 


Albumin   


 


Procalcitonin   


 


TSH   


 


Urine Protein  1+ H  


 


Urine Mucus  Occasional H  














  21





  20:02 06:10 06:10


 


WBC   3.06 L 


 


RBC   4.34 L 


 


Hgb   12.5 L 


 


Hct   38.3 L 


 


RDW   16.9 H 


 


Plt Count   118 L 


 


Plt Count Comment   DECREASED A 


 


Metamyelocytes %   4 H 


 


Myelocytes %   1 H 


 


Lymphocytes #   


 


Lymphocytes # (Manual)   0.31 L 


 


Monocytes # (Manual)   0.09 L 


 


Eosinophils # (Manual)   0 L 


 


APTT   


 


Sodium   


 


Glucose    139 H


 


POC Glucose (mg/dL)  239 H  


 


Calcium    8.4 L


 


Total Protein    4.9 L


 


Albumin    3.10 L


 


Procalcitonin   


 


TSH   


 


Urine Protein   


 


Urine Mucus   














  21





  06:48 11:36


 


WBC  


 


RBC  


 


Hgb  


 


Hct  


 


RDW  


 


Plt Count  


 


Plt Count Comment  


 


Metamyelocytes %  


 


Myelocytes %  


 


Lymphocytes #  


 


Lymphocytes # (Manual)  


 


Monocytes # (Manual)  


 


Eosinophils # (Manual)  


 


APTT  


 


Sodium  


 


Glucose  


 


POC Glucose (mg/dL)  136 H  314 H


 


Calcium  


 


Total Protein  


 


Albumin  


 


Procalcitonin  


 


TSH  


 


Urine Protein  


 


Urine Mucus  














Assessment and Plan


Assessment: 





* Metastatic lung cancer.  Patient has multiple hemorrhagic cerebral metastasis.

   In fact metastatic disease has much improved (from cranial irradiation) on 

  the current computed tomography scan of head, as compared to the previous 

  computed tomography scan and MRI of the brain from 2021.  Patient's 

  generalized weakness has also improved since Decadron has been resumed.  It 

  appears when steroids are tapered off, the vasogenic edema gets worse 

  producing worsening of symptoms.


* Metastatic lung cancer


* History of laryngeal cancer, in remission


* History of laryngectomy due to above.


* X tobacco use


Plan: 





* Patient examination is stable at this time since Decadron has been resumed.  

  His wife agrees that his weakness has remarkably improved.  Patient and his 

  wife are adamant for him to be seen by a neurosurgeon.  I do not believe he is

  a neurosurgical candidate at all based upon the extent of cerebral metastasis.

   The hydrocephalus is stable.  For patient and his wife's satisfaction, 

  neurologically okay for patient to be transferred to [facility] for 

  neurosurgical consultation.


* Consider starting Keppra for seizure prophylaxis.


* Discussed with patient's primary physician Dr. King and .

## 2021-05-19 NOTE — P.DS
Providers


Date of admission: 


05/17/21 20:42





Attending physician: 


Tim Macdonald





Consults: 





                                        





05/17/21 20:42


Consult Physician Routine 


   Consulting Provider: Raymundo Fleming


   Consult Reason/Comments: malignancy


   Do you want consulting provider notified?: Yes





05/17/21 20:43


Consult Physician Routine 


   Consulting Provider: Rik Villarreal


   Consult Reason/Comments: malignancy


   Do you want consulting provider notified?: Yes





05/17/21 21:15


Consult Physician Routine 


   Consulting Provider: Celeste Lieberman


   Consult Reason/Comments: PNA


   Do you want consulting provider notified?: Yes





05/18/21 17:57


Consult Physician Urgent 


   Consulting Provider: Nick Yanez


   Consult Reason/Comments: brain mets


   Do you want consulting provider notified?: Yes





05/18/21 18:07


Consult Physician Routine 


   Consulting Provider: Nolberto Martel


   Consult Reason/Comments: hydrocephalus, weakness


   Do you want consulting provider notified?: Yes











Primary care physician: 


[facility] Course: 





Diagnoses:


Inability to walk, suspected secondary to hydrocephalus and brain metastasis , 

patient transverse, clubbing, for neurosurgical evaluation


Small cell lung cancer with brain metastases status post radiotherapy


Recent history of laryngeal cancer, status post tracheostomy


Possible bilateral pneumonia


Diabetes mellitus


Mild acute hypoxic respiratory failure.


Mild thrombocytopenia





Hospital course:


This is a pleasant 70 years old male with multiple medical problems as above.  

Presents with inability to walk and generalized weakness.  Please refer to 

oncology team note showing patient has recent history of laryngeal cancer and 

status post laryngectomy and tracheostomy is in place.  Followed by small cell 

lung cancer with metastasis to brain, his disease looks responded to radiation 

therapy to his brain where all lesions become smaller except one suspected is 

due to hemorrhagic transformation.


Patient is lying in bed, no significant complaint.  He feels generally weak.  

Breathing is quiet, no coughing, no chest pain.


He is hemodynamically stable and not showing only mild low platelets at 113 

gait.Patient also is currently on dexamethasone 4 mg.


Patient has been evaluated by oncology and nephrology services who recommended 

to transfer the patient to tertiary care center for neurosurgery evaluation 

which is not available this hospital.  Patient and wife are agreeable to be 

transferred.  I discussed the case with the neurosurgeon Dr. Goldberger and he 

accepts the patient


Patient is stable to be transferred in guarded prognosis.





Physical exam


-GENERAL: The patient is alert and oriented, lethargic, not in any acute d

istress.  Generally weak.  Inability to talk due to his tracheostomy


-HEENT: Pupils are round and equally reacting to light. EOMI. No scleral 

icterus. No conjunctival pallor. Normocephalic, atraumatic. No pharyngeal 

erythema. No thyromegaly.  Tracheostomy is in place


CARDIOVASCULAR: S1 and S2 present. No murmurs, rubs, or gallops. 


PULMONARY: Chest is clear to auscultation, no wheezing or crackles. 


ABDOMEN: Soft, nontender, nondistended, normoactive bowel sounds. No palpable 

organomegaly. 


MUSCULOSKELETAL: No joint swelling or deformity. 


EXTREMITIES: No cyanosis, clubbing, or pedal edema. 


NEUROLOGICAL: Gross neurological examination did not reveal any focal deficits. 


SKIN: No rashes. no petechiae.





Time spent more than 35 minutes





Patient Condition at Discharge: Stable





Plan - Discharge Summary


Discharge Rx Participant: No


New Discharge Prescriptions: 


No Action


   Levothyroxine Sodium 150 mcg PO DAILY


   Pantoprazole [Protonix] 40 mg PO AC-BID 14 Days #28 tablet.


   Acetaminophen Tab [Tylenol] 650 mg PO Q6HR PRN  tab


     PRN Reason: Mild Pain Or Fever > 100.5


   Nystatin 100,000 Unit/ml Susp [Mycostatin Oral Susp] 500,000 unit PO QID


   Insulin Detemir (Levemir) [Levemir] 15 unit SQ HS 30 Days #4 syr


   Thiamine [Vitamin B-1] 100 mg PO DAILY@1200 30 Days #30 tab


   Atenolol [Tenormin] 50 mg PO DAILY


   Aspirin 81 mg PO DAILY 30 Days #30 chew


   Folic Acid 1 mg PO DAILY@1200 30 Days #30 tab


   Atorvastatin [Lipitor] 40 mg PO HS


   Multivitamins, Thera [Multivitamin (formulary)] 1 tab PO HS


Discharge Medication List





Levothyroxine Sodium 150 mcg PO DAILY 04/28/21 [History]


Acetaminophen Tab [Tylenol] 650 mg PO Q6HR PRN  tab 04/30/21 [Rx]


Pantoprazole [Protonix] 40 mg PO AC-BID 14 Days #28 tablet. 04/30/21 [Rx]


Atenolol [Tenormin] 50 mg PO DAILY 05/10/21 [History]


Nystatin 100,000 Unit/ml Susp [Mycostatin Oral Susp] 500,000 unit PO QID 

05/10/21 [History]


Aspirin 81 mg PO DAILY 30 Days #30 chew 05/11/21 [Rx]


Folic Acid 1 mg PO DAILY@1200 30 Days #30 tab 05/11/21 [Rx]


Insulin Detemir (Levemir) [Levemir] 15 unit SQ HS 30 Days #4 syr 05/11/21 [Rx]


Thiamine [Vitamin B-1] 100 mg PO DAILY@1200 30 Days #30 tab 05/11/21 [Rx]


Atorvastatin [Lipitor] 40 mg PO HS 05/17/21 [History]


Multivitamins, Thera [Multivitamin (formulary)] 1 tab PO HS 05/17/21 [History]








Follow up Appointment(s)/Referral(s): 


[facility] [NON-STAFF] - 1-2 Days


[facility] [Primary Care Provider] - 1-2 days

## 2021-05-21 NOTE — ECHOF
Referral Reason:chest pain



MEASUREMENTS

--------

HEIGHT: 168.9 cm

WEIGHT: 71.2 kg

BP: 130/77

RVIDd:   3.1 cm     (< 3.3)

IVSd:   1.5 cm     (0.6 - 1.1)

LVIDd:   4.3 cm     (3.9 - 5.3)

LVPWd:   1.5 cm     (0.6 - 1.1)

IVSs:   2.1 cm

LVIDs:   2.9 cm

LVPWs:   1.7 cm

LA Diam:   3.5 cm     (2.7 - 3.8)

LAESV Index (A-L):   27.95 ml/m

Ao Diam:   4.3 cm     (2.0 - 3.7)

AV Cusp:   1.3 cm     (1.5 - 2.6)

MV EXCURSION:   10.738 mm     (> 18.000)

MV EF SLOPE:   38 mm/s     (70 - 150)

EPSS:   0.9 cm

MV E George:   0.50 m/s

MV DecT:   252 ms

MV A George:   0.60 m/s

MV E/A Ratio:   0.83 

AV maxP.80 mmHg

AV meanP.17 mmHg

AR PHT:   709 ms

RAP:   5.00 mmHg

RVSP:   30.29 mmHg







FINDINGS

--------

Resting bradycardia (HR<60bpm).

This was a technically adequate study.

The left ventricular size is normal.   There is moderate concentric left ventricular hypertrophy.   O
verall left ventricular systolic function is normal with, an EF between 65 - 70 %.

The right ventricle is normal in size.

Normal LA  size by volume 22+/-6 ml/m2.

The right atrium is normal in size.

Interatrial and interventricular septum intact.

There is mild aortic valve sclerosis.   There is moderate aortic regurgitation.   There is mild aorti
c stenosis present.   Peak/mean gradient across the Aortic Valve is 21.80mmHg / 12.17mmHg.

Mild mitral annular calcification present.

Mild tricuspid regurgitation present.   Right ventricular systolic pressure is normal at < 35 mmHg.

The pulmonic valve is normal.

The aortic root is dilated measuring 4.3cm.

Normal inferior vena cava with normal inspiratory collapse consistent with estimated right atrial pre
ssure of  5 mmHg.

There is no pericardial effusion.



CONCLUSIONS

--------

1. Resting bradycardia (HR<60bpm).

2. The left ventricular size is normal.

3. There is moderate concentric left ventricular hypertrophy.

4. Overall left ventricular systolic function is normal with, an EF between 65 - 70 %.

5. There is mild aortic valve sclerosis.

6. There is moderate aortic regurgitation.

7. There is mild aortic stenosis present.

8. Peak/mean gradient across the Aortic Valve is 21.80mmHg / 12.17mmHg.

9. Mild mitral annular calcification present.

10. Mild tricuspid regurgitation present.

11. The aortic root is dilated measuring 4.3cm.

12. There is no pericardial effusion.





SONOGRAPHER: Ca Tejada RDCS

## 2021-06-15 ENCOUNTER — HOSPITAL ENCOUNTER (EMERGENCY)
Dept: HOSPITAL 47 - EC | Age: 71
Discharge: HOME | End: 2021-06-15
Payer: OTHER GOVERNMENT

## 2021-06-15 VITALS — SYSTOLIC BLOOD PRESSURE: 98 MMHG | HEART RATE: 78 BPM | DIASTOLIC BLOOD PRESSURE: 61 MMHG | RESPIRATION RATE: 20 BRPM

## 2021-06-15 VITALS — TEMPERATURE: 97.9 F

## 2021-06-15 DIAGNOSIS — E86.0: ICD-10-CM

## 2021-06-15 DIAGNOSIS — C34.90: ICD-10-CM

## 2021-06-15 DIAGNOSIS — C79.31: Primary | ICD-10-CM

## 2021-06-15 DIAGNOSIS — Z85.828: ICD-10-CM

## 2021-06-15 DIAGNOSIS — Z87.891: ICD-10-CM

## 2021-06-15 DIAGNOSIS — Z79.52: ICD-10-CM

## 2021-06-15 DIAGNOSIS — Z85.841: ICD-10-CM

## 2021-06-15 DIAGNOSIS — I10: ICD-10-CM

## 2021-06-15 DIAGNOSIS — Z85.21: ICD-10-CM

## 2021-06-15 DIAGNOSIS — Z79.82: ICD-10-CM

## 2021-06-15 LAB
ALBUMIN SERPL-MCNC: 3.1 G/DL (ref 3.5–5)
ALP SERPL-CCNC: 177 U/L (ref 38–126)
ALT SERPL-CCNC: 232 U/L (ref 4–49)
ANION GAP SERPL CALC-SCNC: 7 MMOL/L
AST SERPL-CCNC: 115 U/L (ref 17–59)
BASOPHILS # BLD AUTO: 0 K/UL (ref 0–0.2)
BASOPHILS NFR BLD AUTO: 0 %
BUN SERPL-SCNC: 13 MG/DL (ref 9–20)
CALCIUM SPEC-MCNC: 8.8 MG/DL (ref 8.4–10.2)
CHLORIDE SERPL-SCNC: 106 MMOL/L (ref 98–107)
CO2 SERPL-SCNC: 22 MMOL/L (ref 22–30)
EOSINOPHIL # BLD AUTO: 0 K/UL (ref 0–0.7)
EOSINOPHIL NFR BLD AUTO: 1 %
ERYTHROCYTE [DISTWIDTH] IN BLOOD BY AUTOMATED COUNT: 4.31 M/UL (ref 4.3–5.9)
ERYTHROCYTE [DISTWIDTH] IN BLOOD: 18.8 % (ref 11.5–15.5)
GLUCOSE SERPL-MCNC: 131 MG/DL (ref 74–99)
HCT VFR BLD AUTO: 40.3 % (ref 39–53)
HGB BLD-MCNC: 13.1 GM/DL (ref 13–17.5)
LYMPHOCYTES # SPEC AUTO: 0.9 K/UL (ref 1–4.8)
LYMPHOCYTES NFR SPEC AUTO: 16 %
MAGNESIUM SPEC-SCNC: 2.2 MG/DL (ref 1.6–2.3)
MCH RBC QN AUTO: 30.3 PG (ref 25–35)
MCHC RBC AUTO-ENTMCNC: 32.5 G/DL (ref 31–37)
MCV RBC AUTO: 93.3 FL (ref 80–100)
MONOCYTES # BLD AUTO: 0.2 K/UL (ref 0–1)
MONOCYTES NFR BLD AUTO: 4 %
NEUTROPHILS # BLD AUTO: 4.4 K/UL (ref 1.3–7.7)
NEUTROPHILS NFR BLD AUTO: 79 %
PH UR: 6.5 [PH] (ref 5–8)
PLATELET # BLD AUTO: 141 K/UL (ref 150–450)
POTASSIUM SERPL-SCNC: 4 MMOL/L (ref 3.5–5.1)
PROT SERPL-MCNC: 5.4 G/DL (ref 6.3–8.2)
SODIUM SERPL-SCNC: 135 MMOL/L (ref 137–145)
SP GR UR: 1.02 (ref 1–1.03)
UROBILINOGEN UR QL STRIP: <2 MG/DL (ref ?–2)
WBC # BLD AUTO: 5.6 K/UL (ref 3.8–10.6)

## 2021-06-15 PROCEDURE — 84484 ASSAY OF TROPONIN QUANT: CPT

## 2021-06-15 PROCEDURE — 76705 ECHO EXAM OF ABDOMEN: CPT

## 2021-06-15 PROCEDURE — 80053 COMPREHEN METABOLIC PANEL: CPT

## 2021-06-15 PROCEDURE — 93005 ELECTROCARDIOGRAM TRACING: CPT

## 2021-06-15 PROCEDURE — 71046 X-RAY EXAM CHEST 2 VIEWS: CPT

## 2021-06-15 PROCEDURE — 99285 EMERGENCY DEPT VISIT HI MDM: CPT

## 2021-06-15 PROCEDURE — 83735 ASSAY OF MAGNESIUM: CPT

## 2021-06-15 PROCEDURE — 85025 COMPLETE CBC W/AUTO DIFF WBC: CPT

## 2021-06-15 PROCEDURE — 36415 COLL VENOUS BLD VENIPUNCTURE: CPT

## 2021-06-15 PROCEDURE — 83605 ASSAY OF LACTIC ACID: CPT

## 2021-06-15 PROCEDURE — 81003 URINALYSIS AUTO W/O SCOPE: CPT

## 2021-06-15 NOTE — US
EXAMINATION TYPE: US gallbladder

 

DATE OF EXAM:   6/15/2021

 

COMPARISON: NONE

 

CLINICAL HISTORY: ab pain. Abdominal pain. Hx brain cancer.

 

EXAM MEASUREMENTS:

 

Liver Length:  21.16 cm   

Gallbladder Wall:  0.25 cm   

CBD:  Not seen. 

Right Kidney:  11.3 x 4.7 x 5.0 cm 

 

 

Pancreas:  Appears heterogeneous. Slightly limited due to gas.

 

Liver:  Appears very heterogeneous. Appears enlarged.

-Complex area seen within the left lobe: 2.1 x 2.6 x 3.6 cm.

-Anechoic area seen within the left lobe: 1.2 x 1.6 x 1.0 cm.

-Possible complex area anterior to the left lobe of the liver, difficult to measure (Approximately 9.
4 cm in transverse).   

 

Gallbladder:  Appears anechoic. Fold seen.

**Evidence for sonographic Wells's sign:  No

 

CBD:  Not seen. 

 

Right Kidney: Anechoic area seen medially: 3.9 x 3.3 x 2.8 cm.

 

-Anechoic area seen, appears to be lateral to right kidney versus possibly within right lobe of liver
.  Area measures: 3.5 x 3.1 x 3.5 cm.

 

Visualized portion of pancreas slightly heterogeneous without mass or ductal dilatation. Visualized l
iver markedly heterogeneously hyperechoic. Evaluation for focal masses suboptimal due to the heteroge
neity. Vague hypoechoic areas marked by the technologist. Cannot exclude underlying masses. No suspic
ious ductal dilatation. Gallbladder seen without shadowing mobile gallstones. Limited images right ki
dney show no hydronephrosis. There is increased cortical echogenicity with scattered thin-walled cyst
s. Findings consistent with product of chronic medical renal disease.

 

IMPRESSION: Hepatomegaly with markedly heterogeneous hyperechoic appearance could reflect diffuse fat
ty infiltration and/or underlying hepatocellular disease. Cannot exclude underlying masses. Consider 
further investigation with contrast-enhanced CT. No acute findings are evident.

## 2021-06-15 NOTE — ED
Weakness HPI





- General


Chief complaint: Weakness


Stated complaint: Abd Pain


Time Seen by Provider: 06/15/21 16:49


Source: patient, EMS, RN notes reviewed


Mode of arrival: EMS


Limitations: no limitations





- History of Present Illness


Initial comments: 





Patient is a 70-year-old male that presents to emergency department complaining 

of weakness.  Patient's wife and son are with him in the exam room.  Wife notes 

the patient has not been eating or drinking as much as usual and she is worried 

about possible dehydration.  She does note that 3 weeks ago he had a shunt 

placed.  She did note that she does have some ecchymosis to his abdomen.  

Patient did not appear to be any distress or pain while laying in bed during 

exam and review.  He did have a tracheostomy that was in place with no signs or 

symptoms of infection.  Patient did note that he was mildly nauseous causing him

to not eat very much.  He denied any other complaints or issues.  He denied any 

chest pain shortness of breath headache diarrhea constipation fever fatigue 

chills.





Patient does have a history of metastatic small cell lung cancer and larynx 

cancer.





- Related Data


                                Home Medications











 Medication  Instructions  Recorded  Confirmed


 


Levothyroxine Sodium 150 mcg PO DAILY 04/28/21 06/15/21


 


Atenolol [Tenormin] 50 mg PO DAILY 05/10/21 06/15/21


 


Nystatin 100,000 Unit/ml Susp 500,000 unit PO QID 05/10/21 06/15/21





[Mycostatin Oral Susp]   


 


Atorvastatin [Lipitor] 40 mg PO HS 05/17/21 06/15/21


 


Multivitamins, Thera [Multivitamin 1 tab PO HS 05/17/21 06/15/21





(formulary)]   


 


Dexamethasone [Decadron] 8 mg PO AS DIRECTED 06/15/21 06/15/21


 


Insulin Detemir (Levemir) [Levemir] 15 unit SQ BID 06/15/21 06/15/21








                                  Previous Rx's











 Medication  Instructions  Recorded


 


Acetaminophen Tab [Tylenol] 650 mg PO Q6HR PRN  tab 21


 


Pantoprazole [Protonix] 40 mg PO AC-BID 14 Days #28 21





 tablet. 


 


Aspirin 81 mg PO DAILY 30 Days #30 chew 21


 


Folic Acid 1 mg PO DAILY@1200 30 Days #30 tab 21


 


Thiamine [Vitamin B-1] 100 mg PO DAILY@1200 30 Days #30 21





 tab 











                                    Allergies











Allergy/AdvReac Type Severity Reaction Status Date / Time


 


No Known Allergies Allergy   Verified 06/15/21 18:23














Review of Systems


ROS Statement: 


Those systems with pertinent positive or pertinent negative responses have been 

documented in the HPI.





ROS Other: All systems not noted in ROS Statement are negative.





Past Medical History


Past Medical History: Cancer, Hypertension, Thyroid Disorder


Additional Past Medical History / Comment(s): Pt recently admitted to [facility] with 

recurrent falls/R leg weakness 2ndary to brain stem lesions/multiple brain 

lesions, hyponatremia. 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 

with laryngectomy/thyroidectomy/neck/throat and nasal reconstruction, 2020 

small cell R lung cancer with chemo, 3/29/21 lung to brain mets/10 radiation 

treatments/now has falls/increased R leg weakness, past skin cancer with 

removals


History of Any Multi-Drug Resistant Organisms: None Reported


Additional Past Surgical History / Comment(s): [facility]: total laryngect

mauro/thyroidectomy, throat/neck/nasal resection with tracheostomy/prosthetic 

speaking valve, 20 brochoscopy with BAL/brushings/biopsies, colonoscopy.


Past Anesthesia/Blood Transfusion Reactions: No Reported Reaction


Past Psychological History: No Psychological Hx Reported


Smoking Status: Former smoker


Past Alcohol Use History: None Reported


Past Drug Use History: None Reported





- Past Family History


  ** Father


Family Medical History: Myocardial Infarction (MI)


Additional Family Medical History / Comment(s): Father  of a MI at the age 

of 58yrs.





  ** Mother


Family Medical History: CVA/TIA


Additional Family Medical History / Comment(s): Mother is alive and is 96yrs 

old.





General Exam


Limitations: no limitations


General appearance: alert, in no apparent distress


Head exam: Present: atraumatic, normocephalic, normal inspection


Eye exam: Present: normal appearance, PERRL, EOMI.  Absent: scleral icterus, 

conjunctival injection, periorbital swelling


Neck exam: Present: normal inspection


Respiratory exam: Present: normal lung sounds bilaterally, other (Tracheostomy 

in place with no signs or symptoms of infection.).  Absent: respiratory 

distress, wheezes, rales, rhonchi, stridor


Cardiovascular Exam: Present: regular rate, normal rhythm, normal heart sounds. 

Absent: systolic murmur, diastolic murmur, rubs, gallop, clicks


GI/Abdominal exam: Present: soft, normal bowel sounds, other (Ecchymosis 

spreading vertically across the abdomen, most likely due to shunt placement).  

Absent: distended, tenderness, guarding, rebound, rigid


Extremities exam: Present: normal inspection, full ROM, normal capillary refill.

 Absent: tenderness, pedal edema, joint swelling, calf tenderness


Neurological exam: Present: alert, oriented X3


Psychiatric exam: Present: normal affect, normal mood


Skin exam: Present: warm, dry, intact, normal color.  Absent: rash





Course


                                   Vital Signs











  06/15/21 06/15/21





  16:24 18:54


 


Temperature 97.9 F 


 


Pulse Rate 72 75


 


Respiratory 18 16





Rate  


 


Blood Pressure 118/83 98/52


 


O2 Sat by Pulse 97 95





Oximetry  














EKG Findings





- EKG Comments:


EKG Findings:: Ventricular rate 71 bpm, NV interval 174 ms, QRS duration 100 ms,

 ms, PRT axis 29/44/87.  Normal sinus rhythm, normal ECG.





Medical Decision Making





- Medical Decision Making





70-year-old male presenting for weakness.


EKG, cardiac monitor, chest x-ray, labs, 4 mg of Zofran ordered.


Ultrasound ordered of gallbladder due to elevated liver enzymes.


Patient declined CT at this time and wishes to go home has follow-up appointment

with oncologist tomorrow.  Patient understands risks versus benefits of staying 

and still wishes to go home.


Case discussed with Dr. Goode, patient can discharge home.





- Lab Data


Result diagrams: 


                                 06/15/21 17:53





                                 06/15/21 17:53


                                   Lab Results











  06/15/21 06/15/21 06/15/21 Range/Units





  17:53 17:53 17:53 


 


WBC  5.6    (3.8-10.6)  k/uL


 


RBC  4.31    (4.30-5.90)  m/uL


 


Hgb  13.1    (13.0-17.5)  gm/dL


 


Hct  40.3    (39.0-53.0)  %


 


MCV  93.3  D    (80.0-100.0)  fL


 


MCH  30.3    (25.0-35.0)  pg


 


MCHC  32.5    (31.0-37.0)  g/dL


 


RDW  18.8 H    (11.5-15.5)  %


 


Plt Count  141 L    (150-450)  k/uL


 


MPV  7.0    


 


Neutrophils %  79    %


 


Lymphocytes %  16    %


 


Monocytes %  4    %


 


Eosinophils %  1    %


 


Basophils %  0    %


 


Neutrophils #  4.4    (1.3-7.7)  k/uL


 


Lymphocytes #  0.9 L    (1.0-4.8)  k/uL


 


Monocytes #  0.2    (0-1.0)  k/uL


 


Eosinophils #  0.0    (0-0.7)  k/uL


 


Basophils #  0.0    (0-0.2)  k/uL


 


Anisocytosis  Slight    


 


Sodium   135 L   (137-145)  mmol/L


 


Potassium   4.0   (3.5-5.1)  mmol/L


 


Chloride   106   ()  mmol/L


 


Carbon Dioxide   22   (22-30)  mmol/L


 


Anion Gap   7   mmol/L


 


BUN   13   (9-20)  mg/dL


 


Creatinine   0.51 L   (0.66-1.25)  mg/dL


 


Est GFR (CKD-EPI)AfAm   >90   (>60 ml/min/1.73 sqM)  


 


Est GFR (CKD-EPI)NonAf   >90   (>60 ml/min/1.73 sqM)  


 


Glucose   131 H   (74-99)  mg/dL


 


Plasma Lactic Acid Buddy    1.9  (0.7-2.0)  mmol/L


 


Calcium   8.8   (8.4-10.2)  mg/dL


 


Magnesium   2.2   (1.6-2.3)  mg/dL


 


Total Bilirubin   0.9   (0.2-1.3)  mg/dL


 


AST   115 H   (17-59)  U/L


 


ALT   232 H   (4-49)  U/L


 


Alkaline Phosphatase   177 H   ()  U/L


 


Troponin I     (0.000-0.034)  ng/mL


 


Total Protein   5.4 L   (6.3-8.2)  g/dL


 


Albumin   3.1 L   (3.5-5.0)  g/dL


 


Urine Color     


 


Urine Appearance     (Clear)  


 


Urine pH     (5.0-8.0)  


 


Ur Specific Gravity     (1.001-1.035)  


 


Urine Protein     (Negative)  


 


Urine Glucose (UA)     (Negative)  


 


Urine Ketones     (Negative)  


 


Urine Blood     (Negative)  


 


Urine Nitrite     (Negative)  


 


Urine Bilirubin     (Negative)  


 


Urine Urobilinogen     (<2.0)  mg/dL


 


Ur Leukocyte Esterase     (Negative)  














  06/15/21 06/15/21 Range/Units





  17:53 18:54 


 


WBC    (3.8-10.6)  k/uL


 


RBC    (4.30-5.90)  m/uL


 


Hgb    (13.0-17.5)  gm/dL


 


Hct    (39.0-53.0)  %


 


MCV    (80.0-100.0)  fL


 


MCH    (25.0-35.0)  pg


 


MCHC    (31.0-37.0)  g/dL


 


RDW    (11.5-15.5)  %


 


Plt Count    (150-450)  k/uL


 


MPV    


 


Neutrophils %    %


 


Lymphocytes %    %


 


Monocytes %    %


 


Eosinophils %    %


 


Basophils %    %


 


Neutrophils #    (1.3-7.7)  k/uL


 


Lymphocytes #    (1.0-4.8)  k/uL


 


Monocytes #    (0-1.0)  k/uL


 


Eosinophils #    (0-0.7)  k/uL


 


Basophils #    (0-0.2)  k/uL


 


Anisocytosis    


 


Sodium    (137-145)  mmol/L


 


Potassium    (3.5-5.1)  mmol/L


 


Chloride    ()  mmol/L


 


Carbon Dioxide    (22-30)  mmol/L


 


Anion Gap    mmol/L


 


BUN    (9-20)  mg/dL


 


Creatinine    (0.66-1.25)  mg/dL


 


Est GFR (CKD-EPI)AfAm    (>60 ml/min/1.73 sqM)  


 


Est GFR (CKD-EPI)NonAf    (>60 ml/min/1.73 sqM)  


 


Glucose    (74-99)  mg/dL


 


Plasma Lactic Acid Buddy    (0.7-2.0)  mmol/L


 


Calcium    (8.4-10.2)  mg/dL


 


Magnesium    (1.6-2.3)  mg/dL


 


Total Bilirubin    (0.2-1.3)  mg/dL


 


AST    (17-59)  U/L


 


ALT    (4-49)  U/L


 


Alkaline Phosphatase    ()  U/L


 


Troponin I  0.023   (0.000-0.034)  ng/mL


 


Total Protein    (6.3-8.2)  g/dL


 


Albumin    (3.5-5.0)  g/dL


 


Urine Color   Yellow  


 


Urine Appearance   Clear  (Clear)  


 


Urine pH   6.5  (5.0-8.0)  


 


Ur Specific Gravity   1.016  (1.001-1.035)  


 


Urine Protein   Trace H  (Negative)  


 


Urine Glucose (UA)   Negative  (Negative)  


 


Urine Ketones   Negative  (Negative)  


 


Urine Blood   Negative  (Negative)  


 


Urine Nitrite   Negative  (Negative)  


 


Urine Bilirubin   Negative  (Negative)  


 


Urine Urobilinogen   <2.0  (<2.0)  mg/dL


 


Ur Leukocyte Esterase   Negative  (Negative)  














- EKG Data


-: EKG Interpreted by Me


EKG shows normal: sinus rhythm


Rate: normal


EKG Comments: 





Ventricular rate 71 bpm, NV interval 174 ms, QRS duration 100 ms,  ms, 

PRT axis 29/44/87.


Normal sinus rhythm, normal ECG.





- Radiology Data


Radiology results: report reviewed, image reviewed


Chest x-ray: Mild cardiomegaly and chronic changes without acute pulmonary 

process.


Ultrasound gallbladder: Hepatomegaly with markedly heterogeneous hyperechoic 

appearance could reflect diffuse fatty infiltration and/or underlying 

hepatocellular disease.  Cannot exclude underlying mass is.  Consider further 

investigation with contrast enhanced CT.  No acute findings evident.





Disposition


Clinical Impression: 


 Dehydration, Small cell lung cancer, Brain metastasis, Lower extremity 

weakness, Weakness





Disposition: HOME SELF-CARE


Condition: Stable


Instructions (If sedation given, give patient instructions):  Weakness (ED)


Additional Instructions: 


Please return to the Emergency Department if symptoms worsen or any other 

concerns.


Follow-up with oncologist mother specialist as planned.


Continue at home medications as prescribed.


Continue to increase oral fluids and oral intake.





Is patient prescribed a controlled substance at d/c from ED?: No


Referrals: 


[facility] [Primary Care Provider] - 1-2 days


Time of Disposition: 21:04

## 2023-05-04 NOTE — XR
EXAMINATION TYPE: XR chest 2V

 

DATE OF EXAM: 6/15/2021

 

COMPARISON: Chest x-ray May 17, 2021. CT chest March 19, 2021

 

HISTORY: Weakness. 

 

TECHNIQUE:  Frontal and lateral views of the chest are obtained.

 

FINDINGS: Background chronic emphysematous and pulmonary fibrotic change redemonstrated. There is no 
new suspicious focal air space opacity, pleural effusion, or pneumothorax seen.  The cardiac silhouet
te size is stable and mildly enlarged. Catheter overlying right thorax probably less well seen on lat
eral view. Degenerative spurring of the spine. Degenerative change bilateral shoulders.

 

IMPRESSION:  Mild cardiomegaly and chronic changes without acute pulmonary process. Cyclophosphamide Pregnancy And Lactation Text: This medication is Pregnancy Category D and it isn't considered safe during pregnancy. This medication is excreted in breast milk.

## 2023-06-26 NOTE — P.CONS
History of Present Illness





- Reason for Consult


Consult date: 21


Malignancy


Requesting physician: Flo Madden





- Chief Complaint


hyperglycemia





- History of Present Illness


Mr. De Luna is a very pleasant  male pt of Dr. Winter who has a Hx of

sq cell cancer of the larynx.  20 he has total laryngectomy with bilateral 

neck dissection Dr. Perez at [facility].  T3, N0 (0/).  He had adjuvant XRT with 

Dr. Villarreal.  20 he had a PET scan new rt hilar mass with mediastinal 

lymphadenopathy, suspected liver & bone mets.  Diagnostic bronchoscopy by Dr. Lieberman 20, path revealing small cell carcinoma.  He started carbo/ 

, treatment f/u scan 20 with response to treatment evidenced by marked

interval improvement in hilar and thoracic adenopathy, no areas of abnormal 

hypermetabolic uptake. Interval progression of osseous disease without abnormal 

uptake. Immunotherapy with tecentriq was added.  in 2021 PET showed complete

metabolic response.  In March pt had c/o rt sided HA, brain mets.  He completed 

XRT and is currently on tapering steroids.  He was hospitalized  after fall,

MRI brain showed majority of masses improved, he was discharged on 5/3/21. Last 

dose of tecentriq was 21


He is admitted with c/o urinary frequency (every 1/2 hour per wife) and fall.  

Wife said his legs were very weak and he was confused.  She called Dr. Villarreal 

and pt is admitted with steroid induced hyperglycemia, treated with fluids and 

insulin.  MRI of the brain is not showing any new or progressive lesions.  Pt is

responding very well during our discussion, he is no longer feeling like he is 

going to fall over, his leg strength is good, wife has help and wants to get him

home so he doesn't get weak.  





Review of Systems





10 point ROS is neg 





Past Medical History


Past Medical History: Cancer, Hypertension, Thyroid Disorder


Additional Past Medical History / Comment(s): Pt recently admitted to [facility] with 

recurrent falls/R leg weakness 2ndary to brain stem lesions/multiple brain 

lesions, hyponatremia.     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 

cancer with laryngectomy/thyroidectomy/neck/throat and nasal reconstruction, 

2020 small cell R lung cancer with chemo, 3/29/21 lung to brain mets/10 radia

tion treatments/now has falls/increased R leg weakness, past skin cancer with 

removals


History of Any Multi-Drug Resistant Organisms: None Reported


Additional Past Surgical History / Comment(s): [facility]: total 

laryngectomy/thyroidectomy, throat/neck/nasal resection with 

tracheostomy/prosthetic speaking valve, 20 brochoscopy with BAL/br

ushings/biopsies, colonoscopy.


Past Anesthesia/Blood Transfusion Reactions: No Reported Reaction


Past Psychological History: No Psychological Hx Reported


Additional Psychological History / Comment(s): CLAUSTROPHOBIA.  Pt resides with 

his spouse.  He has a borrowed walker which is too wide for him and a cane but 

does not use them.  He no longer drives, his spouse takes him to [employer]. Pt is a 

 and receiving home care, PT/OT and nurse thru [facility].  Pt has a 

cane and a walker.


Smoking Status: Former smoker


Past Alcohol Use History: Daily


Additional Past Alcohol Use History / Comment(s): Pt started smoking in  and

quit in .  He was a heavy drinker-6 beers/day but recently quit all 

together.


Past Drug Use History: None Reported





- Past Family History


  ** Father


Family Medical History: Myocardial Infarction (MI)


Additional Family Medical History / Comment(s): Father  of a MI at the age 

of 58yrs.





  ** Mother


Family Medical History: CVA/TIA


Additional Family Medical History / Comment(s): Mother is alive and is 96yrs 

old.





Medications and Allergies


                                Home Medications











 Medication  Instructions  Recorded  Confirmed  Type


 


Levothyroxine Sodium 150 mcg PO DAILY 04/28/21 05/10/21 History


 


Acetaminophen Tab [Tylenol] 650 mg PO Q6HR PRN  tab 04/30/21 05/10/21 Rx


 


Pantoprazole [Protonix] 40 mg PO AC-BID 14 Days #28 04/30/21 05/10/21 Rx





 tablet.   


 


Atenolol [Tenormin] 50 mg PO DAILY 05/10/21 05/10/21 History


 


Dexamethasone [Decadron] 8 mg PO DAILY 05/10/21 05/10/21 History


 


Nystatin 100,000 Unit/ml Susp 500,000 unit PO QID 05/10/21 05/10/21 History





[Mycostatin Oral Susp]    


 


Aspirin 81 mg PO DAILY 30 Days #30 chew 21  Rx


 


Atorvastatin [Lipitor] 80 mg PO DAILY 30 Days #30 tab 21  Rx


 


Clopidogrel [Plavix] 75 mg PO DAILY 30 Days #30 tab 21  Rx


 


Folic Acid 1 mg PO DAILY@1200 30 Days #30 tab 21  Rx


 


Insulin Detemir (Levemir) [Levemir] 15 unit SQ HS 30 Days #4 syr 21  Rx


 


Multivitamins, Thera [Multivitamin 1 each PO DAILY@1200 30 Days #30 21  Rx





(formulary)] tab   


 


Thiamine [Vitamin B-1] 100 mg PO DAILY@1200 30 Days #30 21  Rx





 tab   








                                    Allergies











Allergy/AdvReac Type Severity Reaction Status Date / Time


 


No Known Allergies Allergy   Verified 05/10/21 11:59














Physical Exam


Vitals: 


                                   Vital Signs











  Temp Pulse Pulse Resp BP BP Pulse Ox


 


 21 08:15  98.3 F   55 L  20   130/77  91 L


 


 21 04:35  98.2 F   58 L  20   135/79  94 L


 


 21 00:05  98.2 F   62  20   118/63  95


 


 05/10/21 20:29  97.8 F   59 L  22   143/79  95


 


 05/10/21 19:34  98.2 F  60   18  134/84   94 L


 


 05/10/21 14:00   59 L   16  127/92   95


 


 05/10/21 13:30   58 L   16  131/81   96


 


 05/10/21 13:00   58 L   18  143/80   95


 


 05/10/21 12:30   61   16  124/77   94 L


 


 05/10/21 12:00   60   18  139/81   93 L


 


 05/10/21 10:25  97.8 F  86   18  134/87   94 L








                                Intake and Output











 05/10/21 05/11/21 05/11/21





 22:59 06:59 14:59


 


Output Total 125 225 


 


Balance -125 -225 


 


Output:   


 


  Urine 125 225 


 


Other:   


 


  Voiding Method Urinal Urinal 





 Diaper Diaper 


 


  # Voids 1  


 


  Weight 77.564 kg 71.5 kg 














- Constitutional


General appearance: cooperative, no acute distress, thin





- EENT





trach, neck has tough skin c/w surgery and radiation scarring


Eyes: anicteric sclerae, EOMI


ENT: hearing grossly normal





- Neck


Neck: no lymphadenopathy





- Respiratory


Respiratory: bilateral: CTA





- Cardiovascular


Rhythm: regular


Heart sounds: normal: S1, S2


Abnormal Heart Sounds: no systolic murmur, no diastolic murmur, no rub, no S3 

Gallop, no S4 Gallop, no click, no other


  ** leg


Peripheral Edema: bilateral: None





- Gastrointestinal


General gastrointestinal: no absent bowel sounds, no decreased bowel sounds, no 

distended, no hepatomegaly, no hyperactive bowel sounds, normal bowel sounds, no

organomegaly, no rigid, no scaphoid, soft, no splenomegaly, no tenderness, no 

umbilical hernia, no ventral hernia





- Integumentary


Integumentary: normal





- Musculoskeletal


Musculoskeletal: strength equal bilaterally





- Psychiatric


Psychiatric: A&O x's 3, appropriate affect, intact judgment & insight





Results


CBC & Chem 7: 


                                 21 07:44





                                 21 07:44


Labs: 


                  Abnormal Lab Results - Last 24 Hours (Table)











  05/10/21 05/10/21 05/10/21 Range/Units





  11:05 11:05 11:05 


 


RDW  16.4 H    (11.5-15.5)  %


 


Plt Count  145 L    (150-450)  k/uL


 


Neutrophils #  9.3 H    (1.3-7.7)  k/uL


 


Lymphocytes #  0.4 L    (1.0-4.8)  k/uL


 


APTT   20.0 L   (22.0-30.0)  sec


 


Sodium     (137-145)  mmol/L


 


BUN     (9-20)  mg/dL


 


Glucose     (74-99)  mg/dL


 


POC Glucose (mg/dL)     (75-99)  mg/dL


 


Hemoglobin A1c     (4.0-6.0)  %


 


ALT     (4-49)  U/L


 


Troponin I     (0.000-0.034)  ng/mL


 


Total Protein     (6.3-8.2)  g/dL


 


Albumin     (3.5-5.0)  g/dL


 


Urine Protein    Trace H  (Negative)  


 


Urine Glucose (UA)    4+ H  (Negative)  














  05/10/21 05/10/21 05/10/21 Range/Units





  11:05 11:05 13:07 


 


RDW     (11.5-15.5)  %


 


Plt Count     (150-450)  k/uL


 


Neutrophils #     (1.3-7.7)  k/uL


 


Lymphocytes #     (1.0-4.8)  k/uL


 


APTT     (22.0-30.0)  sec


 


Sodium  135 L    (137-145)  mmol/L


 


BUN  29 H    (9-20)  mg/dL


 


Glucose  400 H    (74-99)  mg/dL


 


POC Glucose (mg/dL)    511 H  (75-99)  mg/dL


 


Hemoglobin A1c     (4.0-6.0)  %


 


ALT  55 H    (4-49)  U/L


 


Troponin I   0.064 H*   (0.000-0.034)  ng/mL


 


Total Protein  5.6 L    (6.3-8.2)  g/dL


 


Albumin  3.2 L    (3.5-5.0)  g/dL


 


Urine Protein     (Negative)  


 


Urine Glucose (UA)     (Negative)  














  05/10/21 05/10/21 05/10/21 Range/Units





  16:13 17:42 18:27 


 


RDW     (11.5-15.5)  %


 


Plt Count     (150-450)  k/uL


 


Neutrophils #     (1.3-7.7)  k/uL


 


Lymphocytes #     (1.0-4.8)  k/uL


 


APTT     (22.0-30.0)  sec


 


Sodium     (137-145)  mmol/L


 


BUN     (9-20)  mg/dL


 


Glucose     (74-99)  mg/dL


 


POC Glucose (mg/dL)  401 H  301 H   (75-99)  mg/dL


 


Hemoglobin A1c    8.3 H  (4.0-6.0)  %


 


ALT     (4-49)  U/L


 


Troponin I     (0.000-0.034)  ng/mL


 


Total Protein     (6.3-8.2)  g/dL


 


Albumin     (3.5-5.0)  g/dL


 


Urine Protein     (Negative)  


 


Urine Glucose (UA)     (Negative)  














  05/10/21 05/10/21 05/10/21 Range/Units





  18:27 20:57 22:12 


 


RDW     (11.5-15.5)  %


 


Plt Count     (150-450)  k/uL


 


Neutrophils #     (1.3-7.7)  k/uL


 


Lymphocytes #     (1.0-4.8)  k/uL


 


APTT     (22.0-30.0)  sec


 


Sodium     (137-145)  mmol/L


 


BUN     (9-20)  mg/dL


 


Glucose     (74-99)  mg/dL


 


POC Glucose (mg/dL)   231 H   (75-99)  mg/dL


 


Hemoglobin A1c     (4.0-6.0)  %


 


ALT     (4-49)  U/L


 


Troponin I  0.050 H*   0.045 H*  (0.000-0.034)  ng/mL


 


Total Protein     (6.3-8.2)  g/dL


 


Albumin     (3.5-5.0)  g/dL


 


Urine Protein     (Negative)  


 


Urine Glucose (UA)     (Negative)  














  21 Range/Units





  06:16 07:44 07:44 


 


RDW   16.7 H   (11.5-15.5)  %


 


Plt Count   126 L   (150-450)  k/uL


 


Neutrophils #     (1.3-7.7)  k/uL


 


Lymphocytes #   0.5 L   (1.0-4.8)  k/uL


 


APTT     (22.0-30.0)  sec


 


Sodium     (137-145)  mmol/L


 


BUN    29 H  (9-20)  mg/dL


 


Glucose    73 L  (74-99)  mg/dL


 


POC Glucose (mg/dL)  137 H    (75-99)  mg/dL


 


Hemoglobin A1c     (4.0-6.0)  %


 


ALT     (4-49)  U/L


 


Troponin I     (0.000-0.034)  ng/mL


 


Total Protein     (6.3-8.2)  g/dL


 


Albumin     (3.5-5.0)  g/dL


 


Urine Protein     (Negative)  


 


Urine Glucose (UA)     (Negative)  











CT Scan - head: report reviewed





Assessment and Plan


(1) Steroid-induced hyperglycemia


Narrative/Plan: 


Likely cause of polyuria, weakness exacerbated by dehydration.  Pt is doing much

better after hydration and insulin





Tapering steroid plan discussed with pt wife.  4mg PO QD x 7 days then 2mg (1/2 

tab) daily x 7 days then stop.  She verbalized understanding 


Status: Acute   Priority: High   Code(s): R73.9 - HYPERGLYCEMIA, UNSPECIFIED; 

T38.0X5A - ADVERSE EFFECT OF GLUCOCORT/SYNTH ANALOG, INIT   SNOMED Code(s): 

589999503


   





(2) Frequent falls


Narrative/Plan: 


Pt wife said he uses assistive devices at home.  [facility] 

[facility] nurse twice a week to help with pt strength and balance.  Agree with 

the same


Status: Acute   Priority: High   Code(s): R29.6 - REPEATED FALLS   SNOMED 

Code(s): 622106423


   





(3) Small cell lung cancer


Status: Chronic   Priority: Medium   Code(s): C34.90 - MALIGNANT NEOPLASM OF 

UNSP PART OF UNSP BRONCHUS OR LUNG   SNOMED Code(s): 942002585


   





(4) Brain metastasis


Narrative/Plan: 


Case discussed with Rad Onc.  No new or progressive brain mets.  Agrees with 

steroid taper.  Cont f/u as scheduled


Reviewed case with Primary Onc.  Pt has referral to Neurosurgeon for evaluation 

and possible shunt for hydrocephalis.  Ok to keep that appt.


Keep appt to f/u with Neurologist.  


Status: Chronic   Priority: Medium   Code(s): C79.31 - SECONDARY MALIGNANT NEOP

LASM OF BRAIN   SNOMED Code(s): 64683640


   





(5) Head and neck cancer


Narrative/Plan: 


No evidence to suggest recurrent disease.  Keep f/u and monitoring as scheduled


Status: Chronic   Code(s): C76.0 - MALIGNANT NEOPLASM OF HEAD, FACE AND NECK   

SNOMED Code(s): 897367681


   


Plan: 





 attests: I have performed H&P and developed impression and plan of care for 

pt,. discussed with dictator.  Agree with dictated note, documented as a scribe. Simponi Counseling:  I discussed with the patient the risks of golimumab including but not limited to myelosuppression, immunosuppression, autoimmune hepatitis, demyelinating diseases, lymphoma, and serious infections.  The patient understands that monitoring is required including a PPD at baseline and must alert us or the primary physician if symptoms of infection or other concerning signs are noted.